# Patient Record
Sex: FEMALE | Race: WHITE | Employment: OTHER | URBAN - METROPOLITAN AREA
[De-identification: names, ages, dates, MRNs, and addresses within clinical notes are randomized per-mention and may not be internally consistent; named-entity substitution may affect disease eponyms.]

---

## 2020-07-13 VITALS
SYSTOLIC BLOOD PRESSURE: 140 MMHG | HEART RATE: 74 BPM | BODY MASS INDEX: 39.49 KG/M2 | OXYGEN SATURATION: 96 % | DIASTOLIC BLOOD PRESSURE: 87 MMHG | HEIGHT: 65 IN | WEIGHT: 237 LBS | TEMPERATURE: 98.4 F

## 2020-07-13 PROBLEM — F41.9 ANXIETY: Status: ACTIVE | Noted: 2020-07-13

## 2020-07-13 PROBLEM — M10.9 GOUT: Status: ACTIVE | Noted: 2020-07-13

## 2020-07-13 PROBLEM — I10 BENIGN ESSENTIAL HYPERTENSION: Status: ACTIVE | Noted: 2020-07-13

## 2020-07-13 PROBLEM — F32.A DEPRESSIVE DISORDER: Status: ACTIVE | Noted: 2020-07-13

## 2020-07-13 RX ORDER — PAROXETINE HYDROCHLORIDE 20 MG/1
TABLET, FILM COATED ORAL DAILY
COMMUNITY
End: 2020-07-27 | Stop reason: SDUPTHER

## 2020-07-13 RX ORDER — ALPRAZOLAM 0.5 MG/1
TABLET ORAL
COMMUNITY
End: 2020-08-03 | Stop reason: SDUPTHER

## 2020-07-13 RX ORDER — AMLODIPINE BESYLATE 2.5 MG/1
TABLET ORAL DAILY
COMMUNITY
End: 2020-07-27 | Stop reason: SDUPTHER

## 2020-07-13 RX ORDER — LISINOPRIL 20 MG/1
TABLET ORAL DAILY
COMMUNITY
End: 2020-07-27 | Stop reason: SDUPTHER

## 2020-07-13 RX ORDER — ALLOPURINOL 100 MG/1
TABLET ORAL DAILY
COMMUNITY
End: 2020-08-03 | Stop reason: SDUPTHER

## 2020-07-21 DIAGNOSIS — I10 BENIGN ESSENTIAL HYPERTENSION: Primary | ICD-10-CM

## 2020-07-21 DIAGNOSIS — F32.A DEPRESSIVE DISORDER: ICD-10-CM

## 2020-07-21 RX ORDER — PAROXETINE HYDROCHLORIDE 20 MG/1
TABLET, FILM COATED ORAL
Qty: 180 TAB | Refills: 0 | OUTPATIENT
Start: 2020-07-21

## 2020-07-27 RX ORDER — AMLODIPINE BESYLATE 2.5 MG/1
2.5 TABLET ORAL DAILY
Qty: 30 TAB | Refills: 0 | Status: SHIPPED | OUTPATIENT
Start: 2020-07-27 | End: 2020-08-03 | Stop reason: SDUPTHER

## 2020-07-27 RX ORDER — PAROXETINE HYDROCHLORIDE 20 MG/1
20 TABLET, FILM COATED ORAL DAILY
Qty: 30 TAB | Refills: 0 | Status: SHIPPED | OUTPATIENT
Start: 2020-07-27 | End: 2020-08-03 | Stop reason: SDUPTHER

## 2020-07-27 RX ORDER — LISINOPRIL 20 MG/1
20 TABLET ORAL DAILY
Qty: 30 TAB | Refills: 0 | Status: SHIPPED | OUTPATIENT
Start: 2020-07-27 | End: 2020-08-03 | Stop reason: SDUPTHER

## 2020-07-27 NOTE — TELEPHONE ENCOUNTER
Patient needs refills on lisinopril, paroxetine, & amlodipine; her appt is scheduled for 08/03/20 but she is completely out of her meds.

## 2020-07-31 VITALS
TEMPERATURE: 98 F | OXYGEN SATURATION: 96 % | HEART RATE: 74 BPM | SYSTOLIC BLOOD PRESSURE: 140 MMHG | WEIGHT: 237 LBS | HEIGHT: 65 IN | BODY MASS INDEX: 39.49 KG/M2 | DIASTOLIC BLOOD PRESSURE: 87 MMHG

## 2020-08-03 ENCOUNTER — OFFICE VISIT (OUTPATIENT)
Dept: PRIMARY CARE CLINIC | Age: 70
End: 2020-08-03
Payer: MEDICARE

## 2020-08-03 VITALS
BODY MASS INDEX: 42.32 KG/M2 | RESPIRATION RATE: 18 BRPM | HEIGHT: 65 IN | OXYGEN SATURATION: 96 % | WEIGHT: 254 LBS | HEART RATE: 79 BPM | TEMPERATURE: 97 F | DIASTOLIC BLOOD PRESSURE: 84 MMHG | SYSTOLIC BLOOD PRESSURE: 126 MMHG

## 2020-08-03 DIAGNOSIS — I10 BENIGN ESSENTIAL HYPERTENSION: ICD-10-CM

## 2020-08-03 DIAGNOSIS — F32.A DEPRESSIVE DISORDER: ICD-10-CM

## 2020-08-03 DIAGNOSIS — M1A.0610 CHRONIC GOUT OF RIGHT KNEE, UNSPECIFIED CAUSE: ICD-10-CM

## 2020-08-03 DIAGNOSIS — F41.9 ANXIETY: Primary | ICD-10-CM

## 2020-08-03 PROBLEM — E66.01 OBESITY, MORBID (HCC): Status: ACTIVE | Noted: 2020-08-03

## 2020-08-03 PROCEDURE — G9717 DOC PT DX DEP/BP F/U NT REQ: HCPCS | Performed by: NURSE PRACTITIONER

## 2020-08-03 PROCEDURE — 1101F PT FALLS ASSESS-DOCD LE1/YR: CPT | Performed by: NURSE PRACTITIONER

## 2020-08-03 PROCEDURE — G8419 CALC BMI OUT NRM PARAM NOF/U: HCPCS | Performed by: NURSE PRACTITIONER

## 2020-08-03 PROCEDURE — 1090F PRES/ABSN URINE INCON ASSESS: CPT | Performed by: NURSE PRACTITIONER

## 2020-08-03 PROCEDURE — G8400 PT W/DXA NO RESULTS DOC: HCPCS | Performed by: NURSE PRACTITIONER

## 2020-08-03 PROCEDURE — G8427 DOCREV CUR MEDS BY ELIG CLIN: HCPCS | Performed by: NURSE PRACTITIONER

## 2020-08-03 PROCEDURE — G8536 NO DOC ELDER MAL SCRN: HCPCS | Performed by: NURSE PRACTITIONER

## 2020-08-03 PROCEDURE — 99214 OFFICE O/P EST MOD 30 MIN: CPT | Performed by: NURSE PRACTITIONER

## 2020-08-03 PROCEDURE — 3017F COLORECTAL CA SCREEN DOC REV: CPT | Performed by: NURSE PRACTITIONER

## 2020-08-03 RX ORDER — ALPRAZOLAM 0.5 MG/1
0.5 TABLET ORAL
Qty: 30 TAB | Refills: 5 | Status: SHIPPED | OUTPATIENT
Start: 2020-08-03 | End: 2021-02-24 | Stop reason: SDUPTHER

## 2020-08-03 RX ORDER — AMLODIPINE BESYLATE 2.5 MG/1
2.5 TABLET ORAL DAILY
Qty: 90 TAB | Refills: 1 | Status: SHIPPED | OUTPATIENT
Start: 2020-08-03 | End: 2021-02-24 | Stop reason: SDUPTHER

## 2020-08-03 RX ORDER — ALLOPURINOL 100 MG/1
100 TABLET ORAL DAILY
Qty: 90 TAB | Refills: 1 | Status: SHIPPED | OUTPATIENT
Start: 2020-08-03 | End: 2021-11-01 | Stop reason: ALTCHOICE

## 2020-08-03 RX ORDER — PAROXETINE HYDROCHLORIDE 20 MG/1
40 TABLET, FILM COATED ORAL DAILY
Qty: 180 TAB | Refills: 1 | Status: SHIPPED | OUTPATIENT
Start: 2020-08-03 | End: 2021-02-24 | Stop reason: SDUPTHER

## 2020-08-03 RX ORDER — LISINOPRIL 20 MG/1
20 TABLET ORAL DAILY
Qty: 90 TAB | Refills: 1 | Status: SHIPPED | OUTPATIENT
Start: 2020-08-03 | End: 2021-02-24 | Stop reason: SDUPTHER

## 2020-08-03 NOTE — PROGRESS NOTES
HISTORY OF PRESENT ILLNESS  Amanda Avina is a 81 y.o. female presents to the office for medication refill and lab work    1. Hypertension: Patients hypertension is well controlled on regimen of amlodipine and lisinopril. Denies headaches, blurred vision or dizziness. 2. Depression/Anxiety:  Patient notes that their depression is well controlled on paxil. Uses xanax PRN for anxiety. Denies SI/HI. 3. Gout:  Patient reports good control over gout on allopurinol. Notes  Zero flare ups in the past 6 months. Notes some pain in thumb but does not think this is related to gout. Vitals:    08/03/20 0817   BP: 126/84   Pulse: 79   Resp: 18   Temp: 97 °F (36.1 °C)   TempSrc: Temporal   SpO2: 96%   Weight: 254 lb (115.2 kg)   Height: 5' 5\" (1.651 m)     Patient Active Problem List   Diagnosis Code    Anxiety F41.9    Benign essential hypertension I10    Depressive disorder F32.9    Gout M10.9    Obesity, morbid ([facility] [address] E66.01     Patient Active Problem List    Diagnosis Date Noted    Obesity, morbid ([address] 08/03/2020    Anxiety 07/13/2020    Benign essential hypertension 07/13/2020    Depressive disorder 07/13/2020    Gout 07/13/2020     Current Outpatient Medications   Medication Sig Dispense Refill    PARoxetine (PAXIL) 20 mg tablet Take 2 Tabs by mouth daily. 180 Tab 1    lisinopriL (PRINIVIL, ZESTRIL) 20 mg tablet Take 1 Tab by mouth daily. 90 Tab 1    amLODIPine (NORVASC) 2.5 mg tablet Take 1 Tab by mouth daily. 90 Tab 1    ALPRAZolam (XANAX) 0.5 mg tablet Take 1 Tab by mouth daily as needed for Anxiety. 30 Tab 5    allopurinoL (ZYLOPRIM) 100 mg tablet Take 1 Tab by mouth daily.  90 Tab 1     Allergies   Allergen Reactions    Tree And Shrub Pollen Unknown (comments)     Past Medical History:   Diagnosis Date    Anemia     Anxiety     Back pain     Depression     Hypertension      Past Surgical History:   Procedure Laterality Date    HX TUBAL LIGATION       Family History   Problem Relation Age of Onset    Stroke Father     Heart Failure Brother     Hypertension Other     Depression Other     Anxiety Other     Anemia Other     Heart Failure Other     Diabetes Other         type 2     Social History     Tobacco Use    Smoking status: Never Smoker    Smokeless tobacco: Never Used   Substance Use Topics    Alcohol use: Not Currently           Review of Systems   Constitutional: Negative for malaise/fatigue and weight loss. Eyes: Negative for blurred vision, double vision and pain. Respiratory: Negative for cough and shortness of breath. Cardiovascular: Negative for chest pain, palpitations, orthopnea and leg swelling. Gastrointestinal: Negative for heartburn and nausea. Musculoskeletal: Negative for joint pain (left thumb) and myalgias. Skin: Negative for itching and rash. Neurological: Negative for dizziness, tingling, loss of consciousness, weakness and headaches. Endo/Heme/Allergies: Does not bruise/bleed easily. Psychiatric/Behavioral: Negative for depression. The patient is nervous/anxious. Physical Exam  Constitutional:       Appearance: Normal appearance. She is obese. HENT:      Head: Normocephalic. Eyes:      Extraocular Movements: Extraocular movements intact. Conjunctiva/sclera: Conjunctivae normal.      Pupils: Pupils are equal, round, and reactive to light. Neck:      Musculoskeletal: Normal range of motion and neck supple. Cardiovascular:      Rate and Rhythm: Normal rate and regular rhythm. Pulses: Normal pulses. Heart sounds: Normal heart sounds. Pulmonary:      Effort: Pulmonary effort is normal.      Breath sounds: Normal breath sounds. Musculoskeletal: Normal range of motion. Skin:     General: Skin is warm and dry. Neurological:      General: No focal deficit present. Mental Status: She is alert and oriented to person, place, and time.    Psychiatric:         Mood and Affect: Mood normal. ASSESSMENT and PLAN    Diagnoses and all orders for this visit:    1. Anxiety  -     ALPRAZolam (XANAX) 0.5 mg tablet; Take 1 Tab by mouth daily as needed for Anxiety. -     CBC WITH AUTOMATED DIFF    2. Depressive disorder  -     PARoxetine (PAXIL) 20 mg tablet; Take 2 Tabs by mouth daily. 3. Benign essential hypertension  -     lisinopriL (PRINIVIL, ZESTRIL) 20 mg tablet; Take 1 Tab by mouth daily. -     amLODIPine (NORVASC) 2.5 mg tablet; Take 1 Tab by mouth daily.  -     CBC WITH AUTOMATED DIFF  -     METABOLIC PANEL, COMPREHENSIVE    4. Chronic gout of right knee, unspecified cause  -     allopurinoL (ZYLOPRIM) 100 mg tablet;  Take 1 Tab by mouth daily.  -     METABOLIC PANEL, COMPREHENSIVE  -     URIC ACID      Last PDMP Eric as Reviewed:  Review User Review Instant Review Result   Vernhuey Tate 8/3/2020  8:32 AM Reviewed PDMP [1]     Berenda Epley, NP

## 2020-08-04 ENCOUNTER — TELEPHONE (OUTPATIENT)
Dept: PRIMARY CARE CLINIC | Age: 70
End: 2020-08-04

## 2020-08-04 LAB
ALBUMIN SERPL-MCNC: 4.4 G/DL (ref 3.8–4.8)
ALBUMIN/GLOB SERPL: 1.5 {RATIO} (ref 1.2–2.2)
ALP SERPL-CCNC: 93 IU/L (ref 39–117)
ALT SERPL-CCNC: 20 IU/L (ref 0–32)
AST SERPL-CCNC: 17 IU/L (ref 0–40)
BASOPHILS # BLD AUTO: 0.1 X10E3/UL (ref 0–0.2)
BASOPHILS NFR BLD AUTO: 1 %
BILIRUB SERPL-MCNC: 0.4 MG/DL (ref 0–1.2)
BUN SERPL-MCNC: 9 MG/DL (ref 8–27)
BUN/CREAT SERPL: 12 (ref 12–28)
CALCIUM SERPL-MCNC: 9.1 MG/DL (ref 8.7–10.3)
CHLORIDE SERPL-SCNC: 101 MMOL/L (ref 96–106)
CO2 SERPL-SCNC: 23 MMOL/L (ref 20–29)
CREAT SERPL-MCNC: 0.78 MG/DL (ref 0.57–1)
EOSINOPHIL # BLD AUTO: 0.3 X10E3/UL (ref 0–0.4)
EOSINOPHIL NFR BLD AUTO: 4 %
ERYTHROCYTE [DISTWIDTH] IN BLOOD BY AUTOMATED COUNT: 12.9 % (ref 11.7–15.4)
GLOBULIN SER CALC-MCNC: 2.9 G/DL (ref 1.5–4.5)
GLUCOSE SERPL-MCNC: 101 MG/DL (ref 65–99)
HCT VFR BLD AUTO: 41.2 % (ref 34–46.6)
HGB BLD-MCNC: 13.8 G/DL (ref 11.1–15.9)
IMM GRANULOCYTES # BLD AUTO: 0.1 X10E3/UL (ref 0–0.1)
IMM GRANULOCYTES NFR BLD AUTO: 1 %
LYMPHOCYTES # BLD AUTO: 2.5 X10E3/UL (ref 0.7–3.1)
LYMPHOCYTES NFR BLD AUTO: 28 %
MCH RBC QN AUTO: 29.4 PG (ref 26.6–33)
MCHC RBC AUTO-ENTMCNC: 33.5 G/DL (ref 31.5–35.7)
MCV RBC AUTO: 88 FL (ref 79–97)
MONOCYTES # BLD AUTO: 1 X10E3/UL (ref 0.1–0.9)
MONOCYTES NFR BLD AUTO: 11 %
NEUTROPHILS # BLD AUTO: 5 X10E3/UL (ref 1.4–7)
NEUTROPHILS NFR BLD AUTO: 55 %
PLATELET # BLD AUTO: 294 X10E3/UL (ref 150–450)
POTASSIUM SERPL-SCNC: 4 MMOL/L (ref 3.5–5.2)
PROT SERPL-MCNC: 7.3 G/DL (ref 6–8.5)
RBC # BLD AUTO: 4.69 X10E6/UL (ref 3.77–5.28)
SODIUM SERPL-SCNC: 141 MMOL/L (ref 134–144)
URATE SERPL-MCNC: 6.5 MG/DL (ref 2.5–7.1)
WBC # BLD AUTO: 9 X10E3/UL (ref 3.4–10.8)

## 2021-02-24 ENCOUNTER — VIRTUAL VISIT (OUTPATIENT)
Dept: PRIMARY CARE CLINIC | Age: 71
End: 2021-02-24
Payer: MEDICARE

## 2021-02-24 DIAGNOSIS — F32.A DEPRESSIVE DISORDER: ICD-10-CM

## 2021-02-24 DIAGNOSIS — F41.9 ANXIETY: ICD-10-CM

## 2021-02-24 DIAGNOSIS — I10 BENIGN ESSENTIAL HYPERTENSION: ICD-10-CM

## 2021-02-24 DIAGNOSIS — E66.01 CLASS 3 SEVERE OBESITY WITH SERIOUS COMORBIDITY AND BODY MASS INDEX (BMI) OF 40.0 TO 44.9 IN ADULT, UNSPECIFIED OBESITY TYPE (HCC): Primary | ICD-10-CM

## 2021-02-24 DIAGNOSIS — M1A.0610 CHRONIC GOUT OF RIGHT KNEE, UNSPECIFIED CAUSE: ICD-10-CM

## 2021-02-24 PROCEDURE — 3017F COLORECTAL CA SCREEN DOC REV: CPT | Performed by: NURSE PRACTITIONER

## 2021-02-24 PROCEDURE — G9717 DOC PT DX DEP/BP F/U NT REQ: HCPCS | Performed by: NURSE PRACTITIONER

## 2021-02-24 PROCEDURE — 1090F PRES/ABSN URINE INCON ASSESS: CPT | Performed by: NURSE PRACTITIONER

## 2021-02-24 PROCEDURE — G8400 PT W/DXA NO RESULTS DOC: HCPCS | Performed by: NURSE PRACTITIONER

## 2021-02-24 PROCEDURE — 1101F PT FALLS ASSESS-DOCD LE1/YR: CPT | Performed by: NURSE PRACTITIONER

## 2021-02-24 PROCEDURE — G8756 NO BP MEASURE DOC: HCPCS | Performed by: NURSE PRACTITIONER

## 2021-02-24 PROCEDURE — 99214 OFFICE O/P EST MOD 30 MIN: CPT | Performed by: NURSE PRACTITIONER

## 2021-02-24 PROCEDURE — G8427 DOCREV CUR MEDS BY ELIG CLIN: HCPCS | Performed by: NURSE PRACTITIONER

## 2021-02-24 RX ORDER — AMLODIPINE BESYLATE 2.5 MG/1
2.5 TABLET ORAL DAILY
Qty: 90 TAB | Refills: 1 | Status: SHIPPED | OUTPATIENT
Start: 2021-02-24 | End: 2021-10-07 | Stop reason: SDUPTHER

## 2021-02-24 RX ORDER — PAROXETINE HYDROCHLORIDE 20 MG/1
40 TABLET, FILM COATED ORAL DAILY
Qty: 180 TAB | Refills: 1 | Status: SHIPPED | OUTPATIENT
Start: 2021-02-24 | End: 2021-09-16 | Stop reason: SDUPTHER

## 2021-02-24 RX ORDER — ALPRAZOLAM 0.5 MG/1
0.5 TABLET ORAL
Qty: 30 TAB | Refills: 5 | Status: SHIPPED | OUTPATIENT
Start: 2021-02-24 | End: 2021-10-07 | Stop reason: SDUPTHER

## 2021-02-24 RX ORDER — LISINOPRIL 20 MG/1
20 TABLET ORAL DAILY
Qty: 90 TAB | Refills: 1 | Status: SHIPPED | OUTPATIENT
Start: 2021-02-24 | End: 2021-10-07 | Stop reason: SDUPTHER

## 2021-02-24 NOTE — PROGRESS NOTES
HISTORY OF PRESENT ILLNESS  Lissy Pina is a 78 y.o. female presents via telemedicine for medication refill. 1. Hypertension: Patients hypertension is well controlled on regimen of amlodipine and lisinopril. Denies headaches, blurred vision or dizziness.        2. Depression/Anxiety:  Patient notes that their depression is well controlled on paxil. Uses xanax PRN for anxiety. Denies SI/HI.     3. Gout: Patient reported that she hasn't been using her allopurinol since she hasn't had a gout flare up in a few years. There were no vitals filed for this visit. Patient Active Problem List   Diagnosis Code    Anxiety F41.9    Benign essential hypertension I10    Depressive disorder F32.9    Gout M10.9    Obesity, morbid ([facility] [address] E66.01     Patient Active Problem List    Diagnosis Date Noted    Obesity, morbid ([facility] [address] 08/03/2020    Anxiety 07/13/2020    Benign essential hypertension 07/13/2020    Depressive disorder 07/13/2020    Gout 07/13/2020     Current Outpatient Medications   Medication Sig Dispense Refill    lisinopriL (PRINIVIL, ZESTRIL) 20 mg tablet Take 1 Tab by mouth daily. 90 Tab 1    PARoxetine (PAXIL) 20 mg tablet Take 2 Tabs by mouth daily. 180 Tab 1    amLODIPine (NORVASC) 2.5 mg tablet Take 1 Tab by mouth daily. 90 Tab 1    ALPRAZolam (XANAX) 0.5 mg tablet Take 1 Tab by mouth daily as needed for Anxiety. 30 Tab 5    allopurinoL (ZYLOPRIM) 100 mg tablet Take 1 Tab by mouth daily.  90 Tab 1     Allergies   Allergen Reactions    Tree And Shrub Pollen Unknown (comments)     Past Medical History:   Diagnosis Date    Anemia     Anxiety     Back pain     Depression     Hypertension      Past Surgical History:   Procedure Laterality Date    HX TUBAL LIGATION       Family History   Problem Relation Age of Onset    Stroke Father     Heart Failure Brother     Hypertension Other     Depression Other     Anxiety Other     Anemia Other     Heart Failure Other     Diabetes Other type 2     Social History     Tobacco Use    Smoking status: Never Smoker    Smokeless tobacco: Never Used   Substance Use Topics    Alcohol use: Not Currently           Review of Systems   Constitutional: Negative for malaise/fatigue and weight loss. Eyes: Negative for blurred vision and double vision. Respiratory: Negative for shortness of breath. Cardiovascular: Negative for chest pain and palpitations. Gastrointestinal: Negative for nausea and vomiting. Musculoskeletal: Negative for joint pain and myalgias. Neurological: Negative for dizziness, tingling, weakness and headaches. Psychiatric/Behavioral: Negative for depression. The patient is nervous/anxious. The patient does not have insomnia. Physical Exam  Constitutional:       Appearance: Normal appearance. She is obese. HENT:      Head: Normocephalic. Eyes:      Conjunctiva/sclera: Conjunctivae normal.   Pulmonary:      Effort: Pulmonary effort is normal.   Skin:     General: Skin is warm and dry. Neurological:      Mental Status: She is alert and oriented to person, place, and time. Psychiatric:         Mood and Affect: Mood normal.         Behavior: Behavior normal.           ASSESSMENT and PLAN  Diagnoses and all orders for this visit:    1. Class 3 severe obesity with serious comorbidity and body mass index (BMI) of 40.0 to 44.9 in adult, unspecified obesity type ([facility] [address]  Comments:  checking lipid panel. Orders:  -     LIPID PANEL    2. Benign essential hypertension  Comments:  well controlled on current regimen. Orders:  -     lisinopriL (PRINIVIL, ZESTRIL) 20 mg tablet; Take 1 Tab by mouth daily. -     amLODIPine (NORVASC) 2.5 mg tablet; Take 1 Tab by mouth daily.  -     METABOLIC PANEL, COMPREHENSIVE    3. Depressive disorder  Comments:  stable on paxil  Orders:  -     PARoxetine (PAXIL) 20 mg tablet; Take 2 Tabs by mouth daily.  -     CBC WITH AUTOMATED DIFF    4.  Chronic gout of right knee, unspecified cause  Comments:  Has not been using allopurinol recently. Denies any gout flare ups. 5. Anxiety  Comments:  xanax PRN. Orders:  -     ALPRAZolam (XANAX) 0.5 mg tablet; Take 1 Tab by mouth daily as needed for Anxiety. -     CBC WITH AUTOMATED DIFF      Last PDMP Eric as Reviewed:  Review User Review Instant Review Result   Omari Steward 2/24/2021  1:56 PM Reviewed PDMP [1]         Kaya Krause, who was evaluated through a synchronous (real-time) audio-video encounter, and/or her healthcare decision maker, is aware that it is a billable service, with coverage as determined by her insurance carrier. She provided verbal consent to proceed: Yes, and patient identification was verified. It was conducted pursuant to the emergency declaration under the [address] [place] [address] and the [email or web address] and [employer] Act. A caregiver was present when appropriate. Ability to conduct physical exam was limited. I was at home. The patient was at home. Kaya Krause is a 78 y.o. female being evaluated by a Virtual Visit (video visit) encounter to address concerns as mentioned above. A caregiver was present when appropriate. Due to this being a TeleHealth encounter (During KUBNO-23 public health emergency), evaluation of the following organ systems was limited: Vitals/Constitutional/EENT/Resp/CV/GI//MS/Neuro/Skin/Heme-Lymph-Imm. Pursuant to the emergency declaration under the [address] [place] [address] and the [email or web address] and [employer] Act, this Virtual Visit was conducted with patient's (and/or legal guardian's) consent, to reduce the risk of exposure to COVID-19 and provide necessary medical care. Services were provided through a video synchronous discussion virtually to substitute for in-person encounter.     --Alma Herzog NP on 2/24/2021 at 1:46 PM    An electronic signature was used to authenticate this note.

## 2021-03-18 LAB
ALBUMIN SERPL-MCNC: 4.4 G/DL (ref 3.8–4.8)
ALBUMIN/GLOB SERPL: 1.6 {RATIO} (ref 1.2–2.2)
ALP SERPL-CCNC: 94 IU/L (ref 39–117)
ALT SERPL-CCNC: 18 IU/L (ref 0–32)
AST SERPL-CCNC: 22 IU/L (ref 0–40)
BASOPHILS # BLD AUTO: 0.1 X10E3/UL (ref 0–0.2)
BASOPHILS NFR BLD AUTO: 1 %
BILIRUB SERPL-MCNC: 0.4 MG/DL (ref 0–1.2)
BUN SERPL-MCNC: 9 MG/DL (ref 8–27)
BUN/CREAT SERPL: 11 (ref 12–28)
CALCIUM SERPL-MCNC: 8.9 MG/DL (ref 8.7–10.3)
CHLORIDE SERPL-SCNC: 103 MMOL/L (ref 96–106)
CHOLEST SERPL-MCNC: 147 MG/DL (ref 100–199)
CO2 SERPL-SCNC: 27 MMOL/L (ref 20–29)
CREAT SERPL-MCNC: 0.81 MG/DL (ref 0.57–1)
EOSINOPHIL # BLD AUTO: 0.3 X10E3/UL (ref 0–0.4)
EOSINOPHIL NFR BLD AUTO: 4 %
ERYTHROCYTE [DISTWIDTH] IN BLOOD BY AUTOMATED COUNT: 12.9 % (ref 11.7–15.4)
GLOBULIN SER CALC-MCNC: 2.8 G/DL (ref 1.5–4.5)
GLUCOSE SERPL-MCNC: 99 MG/DL (ref 65–99)
HCT VFR BLD AUTO: 43.5 % (ref 34–46.6)
HDLC SERPL-MCNC: 58 MG/DL
HGB BLD-MCNC: 14.7 G/DL (ref 11.1–15.9)
IMM GRANULOCYTES # BLD AUTO: 0.1 X10E3/UL (ref 0–0.1)
IMM GRANULOCYTES NFR BLD AUTO: 1 %
LDLC SERPL CALC-MCNC: 59 MG/DL (ref 0–99)
LYMPHOCYTES # BLD AUTO: 2.6 X10E3/UL (ref 0.7–3.1)
LYMPHOCYTES NFR BLD AUTO: 29 %
MCH RBC QN AUTO: 29.4 PG (ref 26.6–33)
MCHC RBC AUTO-ENTMCNC: 33.8 G/DL (ref 31.5–35.7)
MCV RBC AUTO: 87 FL (ref 79–97)
MONOCYTES # BLD AUTO: 0.8 X10E3/UL (ref 0.1–0.9)
MONOCYTES NFR BLD AUTO: 8 %
NEUTROPHILS # BLD AUTO: 5.1 X10E3/UL (ref 1.4–7)
NEUTROPHILS NFR BLD AUTO: 57 %
PLATELET # BLD AUTO: 306 X10E3/UL (ref 150–450)
POTASSIUM SERPL-SCNC: 4.1 MMOL/L (ref 3.5–5.2)
PROT SERPL-MCNC: 7.2 G/DL (ref 6–8.5)
RBC # BLD AUTO: 5 X10E6/UL (ref 3.77–5.28)
SODIUM SERPL-SCNC: 142 MMOL/L (ref 134–144)
TRIGL SERPL-MCNC: 183 MG/DL (ref 0–149)
VLDLC SERPL CALC-MCNC: 30 MG/DL (ref 5–40)
WBC # BLD AUTO: 9 X10E3/UL (ref 3.4–10.8)

## 2021-09-16 ENCOUNTER — TELEPHONE (OUTPATIENT)
Dept: PRIMARY CARE CLINIC | Age: 71
End: 2021-09-16

## 2021-09-16 DIAGNOSIS — F32.A DEPRESSIVE DISORDER: ICD-10-CM

## 2021-09-16 RX ORDER — PAROXETINE HYDROCHLORIDE 20 MG/1
40 TABLET, FILM COATED ORAL DAILY
Qty: 60 TABLET | Refills: 0 | Status: SHIPPED | OUTPATIENT
Start: 2021-09-16 | End: 2021-11-01 | Stop reason: SDUPTHER

## 2021-09-16 NOTE — TELEPHONE ENCOUNTER
Patient called and needs a refill on her Paxil 20 mg to hold her over until her appt on 10/5/2021 at 7:30 am sent into the pharmacy. She can be reached at [phone] if there are any questions.

## 2021-10-07 ENCOUNTER — TELEPHONE (OUTPATIENT)
Dept: PRIMARY CARE CLINIC | Age: 71
End: 2021-10-07

## 2021-10-07 ENCOUNTER — VIRTUAL VISIT (OUTPATIENT)
Dept: PRIMARY CARE CLINIC | Age: 71
End: 2021-10-07

## 2021-10-07 DIAGNOSIS — I10 BENIGN ESSENTIAL HYPERTENSION: ICD-10-CM

## 2021-10-07 DIAGNOSIS — F41.9 ANXIETY: ICD-10-CM

## 2021-10-07 RX ORDER — ALPRAZOLAM 0.5 MG/1
0.5 TABLET ORAL
Qty: 30 TABLET | Refills: 0 | Status: SHIPPED | OUTPATIENT
Start: 2021-10-07 | End: 2021-11-01 | Stop reason: SDUPTHER

## 2021-10-07 RX ORDER — LISINOPRIL 20 MG/1
20 TABLET ORAL DAILY
Qty: 30 TABLET | Refills: 0 | Status: SHIPPED | OUTPATIENT
Start: 2021-10-07 | End: 2021-11-01 | Stop reason: SDUPTHER

## 2021-10-07 RX ORDER — AMLODIPINE BESYLATE 2.5 MG/1
2.5 TABLET ORAL DAILY
Qty: 30 TABLET | Refills: 0 | Status: SHIPPED | OUTPATIENT
Start: 2021-10-07 | End: 2021-11-01 | Stop reason: SDUPTHER

## 2021-10-07 NOTE — PROGRESS NOTES
Patient unable to join virtual call. Must have in office appointment booked going further. Same issue occurred last month with virtual call.

## 2021-10-07 NOTE — PROGRESS NOTES
There were no vitals taken for this visit. 1. Have you been to the ER, urgent care clinic since your last visit? Hospitalized since your last visit?no    2. Have you seen or consulted any other health care providers outside of the [address] [place] [address] since your last visit? Include any pap smears or colon screening.  no  Chief Complaint   Patient presents with    Follow Up Chronic Condition     Pt is asking for refills on amlodipine, lisinopril, paxil, and xanax

## 2021-10-07 NOTE — TELEPHONE ENCOUNTER
Last PDMP Naz Tirado as Reviewed:  Review User Review Instant Review Result   Armandina Dance 10/7/2021  3:11 PM Reviewed PDMP [1]

## 2021-11-01 ENCOUNTER — OFFICE VISIT (OUTPATIENT)
Dept: PRIMARY CARE CLINIC | Age: 71
End: 2021-11-01
Payer: MEDICARE

## 2021-11-01 VITALS
OXYGEN SATURATION: 97 % | HEART RATE: 89 BPM | BODY MASS INDEX: 42.32 KG/M2 | WEIGHT: 254 LBS | SYSTOLIC BLOOD PRESSURE: 124 MMHG | DIASTOLIC BLOOD PRESSURE: 70 MMHG | RESPIRATION RATE: 18 BRPM | HEIGHT: 65 IN | TEMPERATURE: 97.3 F

## 2021-11-01 DIAGNOSIS — I10 BENIGN ESSENTIAL HYPERTENSION: ICD-10-CM

## 2021-11-01 DIAGNOSIS — F41.9 ANXIETY: ICD-10-CM

## 2021-11-01 DIAGNOSIS — Z11.59 NEED FOR HEPATITIS C SCREENING TEST: ICD-10-CM

## 2021-11-01 DIAGNOSIS — F32.A DEPRESSIVE DISORDER: ICD-10-CM

## 2021-11-01 DIAGNOSIS — Z12.11 SCREEN FOR COLON CANCER: ICD-10-CM

## 2021-11-01 DIAGNOSIS — Z00.00 MEDICARE ANNUAL WELLNESS VISIT, SUBSEQUENT: Primary | ICD-10-CM

## 2021-11-01 PROCEDURE — G0439 PPPS, SUBSEQ VISIT: HCPCS | Performed by: NURSE PRACTITIONER

## 2021-11-01 PROCEDURE — 3017F COLORECTAL CA SCREEN DOC REV: CPT | Performed by: NURSE PRACTITIONER

## 2021-11-01 PROCEDURE — G9717 DOC PT DX DEP/BP F/U NT REQ: HCPCS | Performed by: NURSE PRACTITIONER

## 2021-11-01 PROCEDURE — G8754 DIAS BP LESS 90: HCPCS | Performed by: NURSE PRACTITIONER

## 2021-11-01 PROCEDURE — G8752 SYS BP LESS 140: HCPCS | Performed by: NURSE PRACTITIONER

## 2021-11-01 PROCEDURE — G8400 PT W/DXA NO RESULTS DOC: HCPCS | Performed by: NURSE PRACTITIONER

## 2021-11-01 PROCEDURE — G8427 DOCREV CUR MEDS BY ELIG CLIN: HCPCS | Performed by: NURSE PRACTITIONER

## 2021-11-01 PROCEDURE — 1090F PRES/ABSN URINE INCON ASSESS: CPT | Performed by: NURSE PRACTITIONER

## 2021-11-01 PROCEDURE — 1101F PT FALLS ASSESS-DOCD LE1/YR: CPT | Performed by: NURSE PRACTITIONER

## 2021-11-01 PROCEDURE — G8536 NO DOC ELDER MAL SCRN: HCPCS | Performed by: NURSE PRACTITIONER

## 2021-11-01 PROCEDURE — 99214 OFFICE O/P EST MOD 30 MIN: CPT | Performed by: NURSE PRACTITIONER

## 2021-11-01 PROCEDURE — G8417 CALC BMI ABV UP PARAM F/U: HCPCS | Performed by: NURSE PRACTITIONER

## 2021-11-01 RX ORDER — LISINOPRIL 20 MG/1
20 TABLET ORAL DAILY
Qty: 90 TABLET | Refills: 1 | Status: SHIPPED | OUTPATIENT
Start: 2021-11-01 | End: 2022-05-26 | Stop reason: SDUPTHER

## 2021-11-01 RX ORDER — PAROXETINE HYDROCHLORIDE 20 MG/1
40 TABLET, FILM COATED ORAL DAILY
Qty: 180 TABLET | Refills: 1 | Status: SHIPPED | OUTPATIENT
Start: 2021-11-01 | End: 2022-05-18

## 2021-11-01 RX ORDER — ALPRAZOLAM 0.5 MG/1
0.5 TABLET ORAL
Qty: 30 TABLET | Refills: 3 | Status: SHIPPED | OUTPATIENT
Start: 2021-11-01 | End: 2022-05-26 | Stop reason: SDUPTHER

## 2021-11-01 RX ORDER — AMLODIPINE BESYLATE 2.5 MG/1
2.5 TABLET ORAL DAILY
Qty: 90 TABLET | Refills: 1 | Status: SHIPPED | OUTPATIENT
Start: 2021-11-01 | End: 2022-05-26 | Stop reason: SDUPTHER

## 2021-11-01 NOTE — PROGRESS NOTES
Pauly Escobar is a 73 y.o. female presents for Medicare wellness visit. This is a Subsequent Medicare Annual Wellness Visit (AWV), (Performed more than 12 months after effective date of Medicare Part B enrollment and 12 months after last preventive visit.)    I have reviewed the patient's medical history in detail and updated the computerized patient record. History obtained from: the patient. female  73 y.o. WHITE/NON-  Depression Risk Factor Screening:   No flowsheet data found. Fall Risk Factor Screening:     Fall Risk Assessment, last 12 mths 11/1/2021   Able to walk? Yes   Fall in past 12 months? 0   Do you feel unsteady? 0   Are you worried about falling 0       Alcohol Risk Screen    Do you average more than 1 drink per night or more than 7 drinks a week:  No    On any one occasion in the past three months have you have had more than 3 drinks containing alcohol:  No         Functional Ability and Level of Safety:    Hearing: Hearing is good. Activities of Daily Living: The home contains: no safety equipment. Patient does total self care      Ambulation: with no difficulty      Abuse Screen:  Patient is not abused         History and Pertinent Exam   Patient is due for chronic medical conditions and/or has acute concerns in addition to the medicare wellness visit and agrees to do both, understanding there may be a copay for the additional services provided. Other Concerns today:     Hypertension: Patients hypertension is well controlled on regimen of amlodipine and lisinopril. Denies headaches, blurred vision or dizziness. BP is elevated today but patient notes that she is stressed because her freezer is going bad and she has been out running errands trying to find a new freezer.       Depression/Anxiety:  Patient notes that their depression is well controlled on paxil.  Uses xanax PRN for anxiety. Denies SI/HI.     Health & Diet:   Please describe your diet habits: Regular diet. Do you get 5 servings of fruits or vegetables daily? yes  Do you exercise regularly? no    Review of Systems   Constitutional: Negative for malaise/fatigue and weight loss. Eyes: Negative for blurred vision and double vision. Respiratory: Negative for cough and shortness of breath. Cardiovascular: Negative for chest pain, palpitations and leg swelling. Gastrointestinal: Negative for heartburn and nausea. Musculoskeletal: Negative for joint pain and myalgias. Skin: Negative for itching and rash. Neurological: Negative for dizziness, tingling, loss of consciousness, weakness and headaches. Endo/Heme/Allergies: Does not bruise/bleed easily. Psychiatric/Behavioral: Negative for depression. The patient is not nervous/anxious. Reviewed PmHx, FmHx, SocHx as well as meds and allergies, updated and dated in the chart. Patient Active Problem List   Diagnosis Code    Anxiety F41.9    Benign essential hypertension I10    Depressive disorder F32.9    Gout M10.9    Obesity, morbid ([facility] [address] E66.01     Past Medical History:   Diagnosis Date    Anemia     Anxiety     Back pain     Depression     Hypertension       Past Surgical History:   Procedure Laterality Date    HX TUBAL LIGATION       Allergies   Allergen Reactions    Tree And Shrub Pollen Unknown (comments)     Current Outpatient Medications   Medication Sig Dispense Refill    ALPRAZolam (XANAX) 0.5 mg tablet Take 1 Tablet by mouth daily as needed for Anxiety. 30 Tablet 3    amLODIPine (NORVASC) 2.5 mg tablet Take 1 Tablet by mouth daily. 90 Tablet 1    lisinopriL (PRINIVIL, ZESTRIL) 20 mg tablet Take 1 Tablet by mouth daily. 90 Tablet 1    PARoxetine (PAXIL) 20 mg tablet Take 2 Tablets by mouth daily.  180 Tablet 1     Family History   Problem Relation Age of Onset    Stroke Father     Heart Failure Brother     Hypertension Other     Depression Other     Anxiety Other     Anemia Other     Heart Failure Other     Diabetes Other         type 2     Social History     Tobacco Use    Smoking status: Never Smoker    Smokeless tobacco: Never Used   Substance Use Topics    Alcohol use: Not Currently         BP Readings from Last 3 Encounters:   11/01/21 124/70   08/03/20 126/84   12/23/19 140/87      Wt Readings from Last 3 Encounters:   11/01/21 254 lb (115.2 kg)   08/03/20 254 lb (115.2 kg)   12/23/19 237 lb (107.5 kg)     Body mass index is 42.27 kg/m². No exam data present  Physical Exam  Constitutional:       Appearance: She is obese. HENT:      Head: Normocephalic. Eyes:      Conjunctiva/sclera: Conjunctivae normal.   Cardiovascular:      Rate and Rhythm: Normal rate and regular rhythm. Pulses: Normal pulses. Heart sounds: Normal heart sounds. Pulmonary:      Effort: Pulmonary effort is normal.   Skin:     General: Skin is warm and dry. Neurological:      Mental Status: She is alert and oriented to person, place, and time. Psychiatric:         Mood and Affect: Mood normal.         Behavior: Behavior normal.       Was the patient's timed Up & Go test unsteady or longer than 30 seconds? no    Evaluation of Cognitive Function   Mood/affect:  neutral, happy  Orientation: Person, Place, Time and Situation  Appearance: age appropriate and casually dressed  Family member/caregiver input: None  Normal cognitive function during exam.  Specialists/Care Team   Peggy B. Ken Carrel has established care with the following healthcare providers:    Patient Care Team:  Carlos Manuel Torres NP as PCP - General (Nurse Practitioner)  Carlos Manuel Torres NP as PCP - [address] Topics with due status: Overdue       Topic Date Due    Hepatitis C Screening Never done    COVID-19 Vaccine Never done    Colorectal Cancer Screening Combo Never done    Shingrix Vaccine Age 50> Never done    Breast Cancer Screen Mammogram Never done    DTaP/Tdap/Td series 10/01/2010    Bone Densitometry (Dexa) Screening Never done    Pneumococcal 65+ years Never done    Medicare Yearly Exam Never done    Flu Vaccine Never done     Health Maintenance Topics with due status: Not Due       Topic Last Completion Date    Lipid Screen 03/17/2021         Colon cancer:  Cologuard orderd    Lung cancer (LDCT): Not indicated    Hepatitis C:  Recommendation: One time screening for all patient's aged 18-79. and Due, ordered    Diabetes:   Recommendation: USPSTF recommends screening ages 38-69 y/o if overweight or obese. Medicare covers screening in those patients who are overweight, obese, have HTN or dyslipiemia, a personal history of gestational diabetes or prior elevated blood sugar, a family hx of DM, or any patient over age 72 and Due, ordered    Lipids:   Recommendation: screening for hyperlipidemia every 5 years after age 39 and Due, ordered        PREVENTIVE CARE - FEMALE SCREENINGS     Cervical cancer: Recommendation: Every 3 yr from 21-29 and every 5 yr from 33-67, with Pap and HPV testing. and Not Indicated    Breast cancer: Recommendation:  USPSTF recommends screening mammography every 2 yr from 54-69. The decision to start screening mammography in women prior to age 48 years should be an individual one. Women who place a higher value on the potential benefit than the potential harms may choose to begin biennial screening between the ages of 36 and 52 years. Medicare covers annual screening mammography, USPSTF also recommends women with a personal or family history of breast, ovarian, tubal, or peritoneal cancer or who have an ancestry ([employer]ken[employer]) associated with breast cancer susceptibility 1 and 2 (BRCA1/2) gene mutations with an appropriate brief familial risk assessment tool.  Women with a positive result on the risk assessment tool should receive genetic counseling and, if indicated after counseling, genetic testing and mammogram was declined    Osteoporosis: Recommendation: Screen all women at 72, earlier if elevated risk and Declined    AAA:   Recommendation: One-time screening if family history of AAA and Not Indicated        IMMUNIZATIONS     Immunization History   Administered Date(s) Administered    Td 09/30/2010       Pneumovax:   Recommendation: PPSV23 once for all >65 and high risk <65  and Declined    Prevnar:   Recommendation: PCV13 only if >65 and immunocompromised or residing in a nursing home, or in areas of low childhood Pneumococcal vaccination and Declined    Influenza:   Recommendation: Vaccination annually, high dose if 72 or older and Declined    Shingrix:  Recommendation: Vaccination 2 shots 2-6 months apart for all age >47 and Declined    TDaP:    Recommendation: Vaccination Booster with TDaP every 10 yr. and Declined    Discussion of Advance Directive   Discussed with Joycelyn Sahni. Patrick Seth her ability to prepare and advance directive in the case that an injury or illness causes her to be unable to make health care decisions. Date of ACP Conversation: 11/01/21  Persons included in Conversation:  patient  Length of ACP Conversation in minutes:  <16 minutes (Non-Billable)    Authorized Decision Maker (if patient is incapable of making informed decisions): This person is:   Named in Advance Directive or Healthcare Power of             For Patients with Decision Making Capacity:   Values/Goals: Exploration of values, goals, and preferences if recovery is not expected, even with continued medical treatment in the event of:  Imminent death  Severe, permanent brain injury    Conversation Outcomes / Follow-Up Plan:   ACP in process - information provided, considering goals and options    Assessment/Plan   V70.0,     Diagnoses and all orders for this visit:    1. Medicare annual wellness visit, subsequent    2. Screen for colon cancer  -     COLOGUARD TEST (FECAL DNA COLORECTAL CANCER SCREENING); Future    3. Need for hepatitis C screening test  -     HEPATITIS C AB    4. Benign essential hypertension  Comments:  well controlled. She will monitor at home. Orders:  -     METABOLIC PANEL, COMPREHENSIVE  -     LIPID PANEL  -     amLODIPine (NORVASC) 2.5 mg tablet; Take 1 Tablet by mouth daily. -     lisinopriL (PRINIVIL, ZESTRIL) 20 mg tablet; Take 1 Tablet by mouth daily. 5. Anxiety  Comments:  xanax PRN. Orders:  -     ALPRAZolam (XANAX) 0.5 mg tablet; Take 1 Tablet by mouth daily as needed for Anxiety. 6. Depressive disorder  Comments:  stable on paxil  Orders:  -     PARoxetine (PAXIL) 20 mg tablet; Take 2 Tablets by mouth daily.         Last PDMP Marnie Disla as Reviewed:  Review User Review Instant Review Result   Warner Harp 11/1/2021  4:11 PM Reviewed PDMP [1]           Anahy Del Cid NP
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
## 2021-11-02 LAB
ALBUMIN SERPL-MCNC: 4.3 G/DL (ref 3.8–4.8)
ALBUMIN/GLOB SERPL: 1.6 {RATIO} (ref 1.2–2.2)
ALP SERPL-CCNC: 98 IU/L (ref 44–121)
ALT SERPL-CCNC: 23 IU/L (ref 0–32)
AST SERPL-CCNC: 21 IU/L (ref 0–40)
BILIRUB SERPL-MCNC: 0.3 MG/DL (ref 0–1.2)
BUN SERPL-MCNC: 13 MG/DL (ref 8–27)
BUN/CREAT SERPL: 19 (ref 12–28)
CALCIUM SERPL-MCNC: 9.1 MG/DL (ref 8.7–10.3)
CHLORIDE SERPL-SCNC: 103 MMOL/L (ref 96–106)
CHOLEST SERPL-MCNC: 168 MG/DL (ref 100–199)
CO2 SERPL-SCNC: 27 MMOL/L (ref 20–29)
CREAT SERPL-MCNC: 0.68 MG/DL (ref 0.57–1)
GLOBULIN SER CALC-MCNC: 2.7 G/DL (ref 1.5–4.5)
GLUCOSE SERPL-MCNC: 84 MG/DL (ref 65–99)
HCV AB S/CO SERPL IA: 0.2 S/CO RATIO (ref 0–0.9)
HDLC SERPL-MCNC: 58 MG/DL
LDLC SERPL CALC-MCNC: 78 MG/DL (ref 0–99)
POTASSIUM SERPL-SCNC: 4.4 MMOL/L (ref 3.5–5.2)
PROT SERPL-MCNC: 7 G/DL (ref 6–8.5)
SODIUM SERPL-SCNC: 141 MMOL/L (ref 134–144)
TRIGL SERPL-MCNC: 194 MG/DL (ref 0–149)
VLDLC SERPL CALC-MCNC: 32 MG/DL (ref 5–40)

## 2021-11-02 NOTE — PROGRESS NOTES
Lab show triglycerdies are a little elevated so be mind of diet, increasing health fats/omega 3 (think fish, avocado, olive oil, whole grains) can help lower this number. Otherwise lab work looks good!

## 2022-03-18 PROBLEM — F41.9 ANXIETY: Status: ACTIVE | Noted: 2020-07-13

## 2022-03-18 PROBLEM — F32.A DEPRESSIVE DISORDER: Status: ACTIVE | Noted: 2020-07-13

## 2022-03-19 PROBLEM — E66.01 OBESITY, MORBID (HCC): Status: ACTIVE | Noted: 2020-08-03

## 2022-03-19 PROBLEM — I10 BENIGN ESSENTIAL HYPERTENSION: Status: ACTIVE | Noted: 2020-07-13

## 2022-03-20 PROBLEM — M10.9 GOUT: Status: ACTIVE | Noted: 2020-07-13

## 2022-05-17 DIAGNOSIS — F32.A DEPRESSIVE DISORDER: ICD-10-CM

## 2022-05-18 RX ORDER — PAROXETINE HYDROCHLORIDE 20 MG/1
40 TABLET, FILM COATED ORAL DAILY
Qty: 180 TABLET | Refills: 0 | Status: SHIPPED | OUTPATIENT
Start: 2022-05-18 | End: 2022-06-15 | Stop reason: SDUPTHER

## 2022-05-26 ENCOUNTER — VIRTUAL VISIT (OUTPATIENT)
Dept: PRIMARY CARE CLINIC | Age: 72
End: 2022-05-26

## 2022-05-26 DIAGNOSIS — I10 BENIGN ESSENTIAL HYPERTENSION: ICD-10-CM

## 2022-05-26 DIAGNOSIS — F41.9 ANXIETY: ICD-10-CM

## 2022-05-26 RX ORDER — AMLODIPINE BESYLATE 2.5 MG/1
2.5 TABLET ORAL DAILY
Qty: 90 TABLET | Refills: 0 | Status: SHIPPED | OUTPATIENT
Start: 2022-05-26 | End: 2022-06-15 | Stop reason: SDUPTHER

## 2022-05-26 RX ORDER — ALPRAZOLAM 0.5 MG/1
0.5 TABLET ORAL
Qty: 30 TABLET | Refills: 0 | Status: SHIPPED | OUTPATIENT
Start: 2022-05-26 | End: 2022-06-15 | Stop reason: SDUPTHER

## 2022-05-26 RX ORDER — LISINOPRIL 20 MG/1
20 TABLET ORAL DAILY
Qty: 90 TABLET | Refills: 0 | Status: SHIPPED | OUTPATIENT
Start: 2022-05-26 | End: 2022-06-15 | Stop reason: SDUPTHER

## 2022-05-26 NOTE — PROGRESS NOTES
Last PDMP Candace isabel Reviewed:  Review User Review Instant Review Result   Daryn Saliva 5/26/2022  2:00 PM Reviewed PDMP [1]

## 2022-05-26 NOTE — PROGRESS NOTES
Chief Complaint   Patient presents with    Follow Up Chronic Condition     Pt is asking refills all meds       1. Have you been to the ER, urgent care clinic since your last visit? Hospitalized since your last visit?no    2. Have you seen or consulted any other health care providers outside of the [address] [place] [address] since your last visit? Include any pap smears or colon screening. no    There were no vitals taken for this visit.

## 2022-06-15 ENCOUNTER — OFFICE VISIT (OUTPATIENT)
Dept: PRIMARY CARE CLINIC | Age: 72
End: 2022-06-15
Payer: MEDICARE

## 2022-06-15 VITALS
SYSTOLIC BLOOD PRESSURE: 130 MMHG | RESPIRATION RATE: 18 BRPM | OXYGEN SATURATION: 96 % | HEART RATE: 72 BPM | HEIGHT: 65 IN | DIASTOLIC BLOOD PRESSURE: 75 MMHG | TEMPERATURE: 97.1 F | WEIGHT: 248 LBS | BODY MASS INDEX: 41.32 KG/M2

## 2022-06-15 DIAGNOSIS — F32.A DEPRESSIVE DISORDER: ICD-10-CM

## 2022-06-15 DIAGNOSIS — E66.01 OBESITY, CLASS III, BMI 40-49.9 (MORBID OBESITY) (HCC): ICD-10-CM

## 2022-06-15 DIAGNOSIS — F41.9 ANXIETY: ICD-10-CM

## 2022-06-15 DIAGNOSIS — I10 BENIGN ESSENTIAL HYPERTENSION: ICD-10-CM

## 2022-06-15 PROCEDURE — G8400 PT W/DXA NO RESULTS DOC: HCPCS | Performed by: NURSE PRACTITIONER

## 2022-06-15 PROCEDURE — G8417 CALC BMI ABV UP PARAM F/U: HCPCS | Performed by: NURSE PRACTITIONER

## 2022-06-15 PROCEDURE — 1090F PRES/ABSN URINE INCON ASSESS: CPT | Performed by: NURSE PRACTITIONER

## 2022-06-15 PROCEDURE — 99214 OFFICE O/P EST MOD 30 MIN: CPT | Performed by: NURSE PRACTITIONER

## 2022-06-15 PROCEDURE — 3017F COLORECTAL CA SCREEN DOC REV: CPT | Performed by: NURSE PRACTITIONER

## 2022-06-15 PROCEDURE — G9717 DOC PT DX DEP/BP F/U NT REQ: HCPCS | Performed by: NURSE PRACTITIONER

## 2022-06-15 PROCEDURE — G8752 SYS BP LESS 140: HCPCS | Performed by: NURSE PRACTITIONER

## 2022-06-15 PROCEDURE — G8754 DIAS BP LESS 90: HCPCS | Performed by: NURSE PRACTITIONER

## 2022-06-15 PROCEDURE — G8536 NO DOC ELDER MAL SCRN: HCPCS | Performed by: NURSE PRACTITIONER

## 2022-06-15 PROCEDURE — G8427 DOCREV CUR MEDS BY ELIG CLIN: HCPCS | Performed by: NURSE PRACTITIONER

## 2022-06-15 PROCEDURE — 1123F ACP DISCUSS/DSCN MKR DOCD: CPT | Performed by: NURSE PRACTITIONER

## 2022-06-15 PROCEDURE — 1101F PT FALLS ASSESS-DOCD LE1/YR: CPT | Performed by: NURSE PRACTITIONER

## 2022-06-15 RX ORDER — LISINOPRIL 20 MG/1
20 TABLET ORAL DAILY
Qty: 90 TABLET | Refills: 1 | Status: SHIPPED | OUTPATIENT
Start: 2022-06-15

## 2022-06-15 RX ORDER — PAROXETINE HYDROCHLORIDE 20 MG/1
40 TABLET, FILM COATED ORAL DAILY
Qty: 180 TABLET | Refills: 1 | Status: SHIPPED | OUTPATIENT
Start: 2022-06-15

## 2022-06-15 RX ORDER — ALPRAZOLAM 0.5 MG/1
0.5 TABLET ORAL
Qty: 30 TABLET | Refills: 2 | Status: SHIPPED | OUTPATIENT
Start: 2022-06-15

## 2022-06-15 RX ORDER — AMLODIPINE BESYLATE 2.5 MG/1
2.5 TABLET ORAL DAILY
Qty: 90 TABLET | Refills: 1 | Status: SHIPPED | OUTPATIENT
Start: 2022-06-15

## 2022-06-15 NOTE — PROGRESS NOTES
Chief Complaint   Patient presents with    Follow Up Chronic Condition     pt is asking for refills on all meds        1. Have you been to the ER, urgent care clinic since your last visit? Hospitalized since your last visit?no    2. Have you seen or consulted any other health care providers outside of the [address] [place] [address] since your last visit? Include any pap smears or colon screening.  no    Visit Vitals  /75 (BP 1 Location: Right arm, BP Patient Position: At rest, BP Cuff Size: Adult)   Pulse 72   Temp 97.1 °F (36.2 °C) (Temporal)   Resp 18   Ht 5' 5\" (1.651 m)   Wt 248 lb (112.5 kg)   SpO2 96%   BMI 41.27 kg/m²

## 2022-06-15 NOTE — PROGRESS NOTES
HISTORY OF PRESENT ILLNESS  Mick Reyez is a 73 y.o. female presents for follow up HTN and anxiety. Hypertension: Patients hypertension is well controlled on regimen of lisinopirl and amlodipine. Denies headaches, blurred vision or dizziness. Depression/Anxiety:  Patient notes that their anxiety is well controlled on paxil. Patient uses xanax PRN. Denies SI/HI. Declines colonoscopy and mammogram.   Vitals:    06/15/22 1501   BP: 130/75   Pulse: 72   Resp: 18   Temp: 97.1 °F (36.2 °C)   TempSrc: Temporal   SpO2: 96%   Weight: 248 lb (112.5 kg)   Height: 5' 5\" (1.651 m)     Patient Active Problem List   Diagnosis Code    Anxiety F41.9    Benign essential hypertension I10    Depressive disorder F32. A    Gout M10.9    Obesity, morbid ([facility] E66.01     Patient Active Problem List    Diagnosis Date Noted    Obesity, morbid ([facility] 08/03/2020    Anxiety 07/13/2020    Benign essential hypertension 07/13/2020    Depressive disorder 07/13/2020    Gout 07/13/2020     Current Outpatient Medications   Medication Sig Dispense Refill    PARoxetine (PAXIL) 20 mg tablet Take 2 Tablets by mouth daily. 180 Tablet 1    amLODIPine (NORVASC) 2.5 mg tablet Take 1 Tablet by mouth daily. 90 Tablet 1    lisinopriL (PRINIVIL, ZESTRIL) 20 mg tablet Take 1 Tablet by mouth daily. 90 Tablet 1    ALPRAZolam (XANAX) 0.5 mg tablet Take 1 Tablet by mouth daily as needed for Anxiety.  30 Tablet 2     Allergies   Allergen Reactions    Tree And Shrub Pollen Unknown (comments)     Past Medical History:   Diagnosis Date    Anemia     Anxiety     Back pain     Depression     Hypertension      Past Surgical History:   Procedure Laterality Date    HX TUBAL LIGATION       Family History   Problem Relation Age of Onset    Stroke Father     Heart Failure Brother     Hypertension Other     Depression Other     Anxiety Other     Anemia Other     Heart Failure Other     Diabetes Other         type 2     Social History Tobacco Use    Smoking status: Never Smoker    Smokeless tobacco: Never Used   Substance Use Topics    Alcohol use: Not Currently           Review of Systems   Constitutional: Negative for malaise/fatigue and weight loss. Eyes: Negative for blurred vision and double vision. Respiratory: Negative for shortness of breath. Cardiovascular: Negative for chest pain and palpitations. Neurological: Negative for dizziness, tingling, tremors and headaches. Psychiatric/Behavioral: The patient is not nervous/anxious and does not have insomnia. Physical Exam  Constitutional:       Appearance: Normal appearance. She is obese. HENT:      Head: Normocephalic. Eyes:      Extraocular Movements: Extraocular movements intact. Conjunctiva/sclera: Conjunctivae normal.      Pupils: Pupils are equal, round, and reactive to light. Cardiovascular:      Rate and Rhythm: Normal rate and regular rhythm. Pulses: Normal pulses. Heart sounds: Normal heart sounds. Pulmonary:      Effort: Pulmonary effort is normal.      Breath sounds: Normal breath sounds. Musculoskeletal:         General: Normal range of motion. Cervical back: Normal range of motion and neck supple. Skin:     General: Skin is warm and dry. Neurological:      General: No focal deficit present. Mental Status: She is alert and oriented to person, place, and time. Psychiatric:         Mood and Affect: Mood normal.           ASSESSMENT and PLAN  Diagnoses and all orders for this visit:    1. Depressive disorder  Comments:  stable on paxil  Orders:  -     PARoxetine (PAXIL) 20 mg tablet; Take 2 Tablets by mouth daily.  -     CBC WITH AUTOMATED DIFF    2. Benign essential hypertension  Comments:  well controlled. She will monitor at home. Orders:  -     amLODIPine (NORVASC) 2.5 mg tablet; Take 1 Tablet by mouth daily. -     lisinopriL (PRINIVIL, ZESTRIL) 20 mg tablet;  Take 1 Tablet by mouth daily.  -     METABOLIC PANEL, COMPREHENSIVE  -     LIPID PANEL    3. Anxiety  Comments:  xanax PRN. Orders:  -     ALPRAZolam (XANAX) 0.5 mg tablet; Take 1 Tablet by mouth daily as needed for Anxiety. 4. Obesity, Class III, BMI 40-49.9 (morbid obesity) ([facility]  Comments:  working on lifestyle changes. Patient has lost 10 lbs. Encouraged to continue lifestyle changes.           Florence Campos, NP

## 2022-06-16 LAB
ALBUMIN SERPL-MCNC: 4.3 G/DL (ref 3.8–4.8)
ALBUMIN/GLOB SERPL: 1.6 {RATIO} (ref 1.2–2.2)
ALP SERPL-CCNC: 107 IU/L (ref 44–121)
ALT SERPL-CCNC: 22 IU/L (ref 0–32)
AST SERPL-CCNC: 19 IU/L (ref 0–40)
BASOPHILS # BLD AUTO: 0.1 X10E3/UL (ref 0–0.2)
BASOPHILS NFR BLD AUTO: 1 %
BILIRUB SERPL-MCNC: 0.5 MG/DL (ref 0–1.2)
BUN SERPL-MCNC: 15 MG/DL (ref 8–27)
BUN/CREAT SERPL: 21 (ref 12–28)
CALCIUM SERPL-MCNC: 9.5 MG/DL (ref 8.7–10.3)
CHLORIDE SERPL-SCNC: 103 MMOL/L (ref 96–106)
CHOLEST SERPL-MCNC: 169 MG/DL (ref 100–199)
CO2 SERPL-SCNC: 25 MMOL/L (ref 20–29)
CREAT SERPL-MCNC: 0.7 MG/DL (ref 0.57–1)
EGFR: 94 ML/MIN/1.73
EOSINOPHIL # BLD AUTO: 0.4 X10E3/UL (ref 0–0.4)
EOSINOPHIL NFR BLD AUTO: 4 %
ERYTHROCYTE [DISTWIDTH] IN BLOOD BY AUTOMATED COUNT: 13.5 % (ref 11.7–15.4)
GLOBULIN SER CALC-MCNC: 2.7 G/DL (ref 1.5–4.5)
GLUCOSE SERPL-MCNC: 89 MG/DL (ref 65–99)
HCT VFR BLD AUTO: 40.9 % (ref 34–46.6)
HDLC SERPL-MCNC: 53 MG/DL
HGB BLD-MCNC: 13.5 G/DL (ref 11.1–15.9)
IMM GRANULOCYTES # BLD AUTO: 0.2 X10E3/UL (ref 0–0.1)
IMM GRANULOCYTES NFR BLD AUTO: 2 %
LDLC SERPL CALC-MCNC: 88 MG/DL (ref 0–99)
LYMPHOCYTES # BLD AUTO: 2.7 X10E3/UL (ref 0.7–3.1)
LYMPHOCYTES NFR BLD AUTO: 24 %
MCH RBC QN AUTO: 28.9 PG (ref 26.6–33)
MCHC RBC AUTO-ENTMCNC: 33 G/DL (ref 31.5–35.7)
MCV RBC AUTO: 88 FL (ref 79–97)
MONOCYTES # BLD AUTO: 1.1 X10E3/UL (ref 0.1–0.9)
MONOCYTES NFR BLD AUTO: 10 %
NEUTROPHILS # BLD AUTO: 6.5 X10E3/UL (ref 1.4–7)
NEUTROPHILS NFR BLD AUTO: 59 %
PLATELET # BLD AUTO: 324 X10E3/UL (ref 150–450)
POTASSIUM SERPL-SCNC: 4.3 MMOL/L (ref 3.5–5.2)
PROT SERPL-MCNC: 7 G/DL (ref 6–8.5)
RBC # BLD AUTO: 4.67 X10E6/UL (ref 3.77–5.28)
SODIUM SERPL-SCNC: 143 MMOL/L (ref 134–144)
TRIGL SERPL-MCNC: 165 MG/DL (ref 0–149)
VLDLC SERPL CALC-MCNC: 28 MG/DL (ref 5–40)
WBC # BLD AUTO: 11.1 X10E3/UL (ref 3.4–10.8)

## 2022-06-16 NOTE — PROGRESS NOTES
Lab work shows that triglycerides are elevated, continuing to make changes to diet to help. Including more healthy fats and omega 3s in diet can help (think fish, olive oil, nuts, avocado, whole grains). Otherwise, lab work looks great.

## 2022-11-07 DIAGNOSIS — F41.9 ANXIETY: ICD-10-CM

## 2022-11-08 RX ORDER — ALPRAZOLAM 0.5 MG/1
TABLET ORAL
Qty: 30 TABLET | Refills: 0 | Status: SHIPPED | OUTPATIENT
Start: 2022-11-08

## 2022-12-08 ENCOUNTER — VIRTUAL VISIT (OUTPATIENT)
Dept: PRIMARY CARE CLINIC | Age: 72
End: 2022-12-08
Payer: MEDICARE

## 2022-12-08 DIAGNOSIS — F32.A DEPRESSIVE DISORDER: ICD-10-CM

## 2022-12-08 DIAGNOSIS — F41.9 ANXIETY: Chronic | ICD-10-CM

## 2022-12-08 DIAGNOSIS — I10 BENIGN ESSENTIAL HYPERTENSION: ICD-10-CM

## 2022-12-08 DIAGNOSIS — Z00.00 MEDICARE ANNUAL WELLNESS VISIT, SUBSEQUENT: Primary | ICD-10-CM

## 2022-12-08 RX ORDER — BUSPIRONE HYDROCHLORIDE 5 MG/1
5 TABLET ORAL 2 TIMES DAILY
Qty: 60 TABLET | Refills: 5 | Status: SHIPPED | OUTPATIENT
Start: 2022-12-08

## 2022-12-08 RX ORDER — PAROXETINE HYDROCHLORIDE 20 MG/1
40 TABLET, FILM COATED ORAL DAILY
Qty: 180 TABLET | Refills: 1 | Status: SHIPPED | OUTPATIENT
Start: 2022-12-08

## 2022-12-08 RX ORDER — AMLODIPINE BESYLATE 2.5 MG/1
2.5 TABLET ORAL DAILY
Qty: 90 TABLET | Refills: 1 | Status: SHIPPED | OUTPATIENT
Start: 2022-12-08

## 2022-12-08 RX ORDER — ALPRAZOLAM 0.5 MG/1
TABLET ORAL
Qty: 30 TABLET | Refills: 2 | Status: SHIPPED | OUTPATIENT
Start: 2022-12-08

## 2022-12-08 RX ORDER — LISINOPRIL 20 MG/1
20 TABLET ORAL DAILY
Qty: 90 TABLET | Refills: 1 | Status: SHIPPED | OUTPATIENT
Start: 2022-12-08

## 2022-12-08 NOTE — PROGRESS NOTES
Pati Leslie is a 81 y.o. female presents for Medicare wellness visit. This is a Subsequent Medicare Annual Wellness Visit (AWV), (Performed more than 12 months after effective date of Medicare Part B enrollment and 12 months after last preventive visit.)    I have reviewed the patient's medical history in detail and updated the computerized patient record. History obtained from: the patient. female  81 y.o. WHITE/NON-  Depression Risk Factor Screening:     3 most recent PHQ Screens 12/8/2022   Little interest or pleasure in doing things Not at all   Feeling down, depressed, irritable, or hopeless Not at all   Total Score PHQ 2 0          Fall Risk Factor Screening:     Fall Risk Assessment, last 12 mths 6/15/2022   Able to walk? Yes   Fall in past 12 months? 0   Do you feel unsteady? 0   Are you worried about falling 0       Alcohol Risk Screen    Do you average more than 1 drink per night or more than 7 drinks a week:  No    On any one occasion in the past three months have you have had more than 3 drinks containing alcohol:  No         Functional Ability and Level of Safety:    Hearing: Hearing is good. Activities of Daily Living: The home contains: no safety equipment. Patient does total self care      Ambulation: with no difficulty      Abuse Screen:  Patient is not abused         History and Pertinent Exam   Patient is due for chronic medical conditions and/or has acute concerns in addition to the medicare wellness visit and agrees to do both, understanding there may be a copay for the additional services provided. Other Concerns today: See acute note  Health & Diet:   Please describe your diet habits: Regular diet. Do you get 5 servings of fruits or vegetables daily? yes  Do you exercise regularly? no    ROS  Reviewed PmHx, FmHx, SocHx as well as meds and allergies, updated and dated in the chart. Patient Active Problem List   Diagnosis Code    Anxiety F41. 9    Benign essential hypertension I10    Depressive disorder F32. A    Gout M10.9    Obesity, morbid ([facility] E66.01     Past Medical History:   Diagnosis Date    Anemia     Anxiety     Back pain     Depression     Hypertension       Past Surgical History:   Procedure Laterality Date    HX TUBAL LIGATION       Allergies   Allergen Reactions    Tree And Shrub Pollen Unknown (comments)     Current Outpatient Medications   Medication Sig Dispense Refill    PARoxetine (PAXIL) 20 mg tablet Take 2 Tablets by mouth daily. 180 Tablet 1    ALPRAZolam (XANAX) 0.5 mg tablet TAKE 1 TABLET BY MOUTH ONCE DAILY AS NEEDED FOR ANXIETY 30 Tablet 2    busPIRone (BUSPAR) 5 mg tablet Take 1 Tablet by mouth two (2) times a day. 60 Tablet 5    amLODIPine (NORVASC) 2.5 mg tablet Take 1 Tablet by mouth daily. 90 Tablet 1    lisinopriL (PRINIVIL, ZESTRIL) 20 mg tablet Take 1 Tablet by mouth daily. 80 Tablet 1     Family History   Problem Relation Age of Onset    Stroke Father     Heart Failure Brother     Hypertension Other     Depression Other     Anxiety Other     Anemia Other     Heart Failure Other     Diabetes Other         type 2     Social History     Tobacco Use    Smoking status: Never    Smokeless tobacco: Never   Substance Use Topics    Alcohol use: Not Currently         BP Readings from Last 3 Encounters:   06/15/22 130/75   11/01/21 124/70   08/03/20 126/84      Wt Readings from Last 3 Encounters:   06/15/22 248 lb (112.5 kg)   11/01/21 254 lb (115.2 kg)   08/03/20 254 lb (115.2 kg)     There is no height or weight on file to calculate BMI. No results found. Physical Exam      Evaluation of Cognitive Function   Mood/affect:  neutral, happy  Orientation: Person, Place, Time and Situation  Appearance: age appropriate and casually dressed  Family member/caregiver input: None  Normal cognitive function during exam.  Specialists/Care Team   Sana Claudio has established care with the following healthcare providers:    Patient Care Team:  Kurt Stokes NP as PCP - General (Nurse Practitioner)  Kurt Stokes NP as PCP - [facility]      [address] [place] [address] Maintenance Topics with due status: Overdue       Topic Date Due    COVID-19 Vaccine Never done    Colorectal Cancer Screening Combo Never done    Shingrix Vaccine Age 50> Never done    Breast Cancer Screen Mammogram Never done    DTaP/Tdap/Td series 10/01/2010    Bone Densitometry (Dexa) Screening Never done    Pneumococcal 65+ years Never done    Flu Vaccine Never done     Health Maintenance Topics with due status: Not Due       Topic Last Completion Date    Depression Monitoring 06/15/2022    Lipid Screen 06/15/2022    Medicare Yearly Exam 12/08/2022     Health Maintenance Topics with due status: Completed       Topic Last Completion Date    Hepatitis C Screening 11/01/2021         Colon cancer:  Declined    Lung cancer (LDCT): Not indicated    Hepatitis C:  Up to date    Diabetes:   Up to date    Lipids:   Up to date        PREVENTIVE CARE - FEMALE SCREENINGS     Cervical cancer: Recommendation: Every 3 yr from 21-29 and every 5 yr from 33-67, with Pap and HPV testing. and Not Indicated    Breast cancer: Recommendation:  USPSTF recommends screening mammography every 2 yr from 54-69. The decision to start screening mammography in women prior to age 48 years should be an individual one. Women who place a higher value on the potential benefit than the potential harms may choose to begin biennial screening between the ages of 36 and 52 years. Medicare covers annual screening mammography, USPSTF also recommends women with a personal or family history of breast, ovarian, tubal, or peritoneal cancer or who have an ancestry (Ashkenazi Restorationist) associated with breast cancer susceptibility 1 and 2 (BRCA1/2) gene mutations with an appropriate brief familial risk assessment tool.  Women with a positive result on the risk assessment tool should receive genetic counseling and, if indicated after counseling, genetic testing and mammogram was declined    Osteoporosis: Recommendation: Screen all women at 72, earlier if elevated risk and Declined    AAA:   Recommendation: One-time screening if family history of AAA and Not Indicated        IMMUNIZATIONS     Immunization History   Administered Date(s) Administered    Td 09/30/2010       Pneumovax:   Recommendation: PPSV23 once for all >65 and high risk <65  and Declined    Prevnar:   Recommendation: PCV13 only if >65 and immunocompromised or residing in a nursing home, or in areas of low childhood Pneumococcal vaccination and Declined    Influenza:   Recommendation: Vaccination annually, high dose if 72 or older and Declined    Shingrix:  Recommendation: Vaccination 2 shots 2-6 months apart for all age >47 and Declined    TDaP:    Recommendation: Vaccination Booster with TDaP every 10 yr. and Declined    Discussion of Advance Directive   Discussed with Tiago Newman Ree her ability to prepare and advance directive in the case that an injury or illness causes her to be unable to make health care decisions. Date of ACP Conversation: 12/08/22  Persons included in Conversation:  patient  Length of ACP Conversation in minutes:  <16 minutes (Non-Billable)    Authorized Decision Maker (if patient is incapable of making informed decisions): This person is:   Named in Advance Directive or Healthcare Power of             For Patients with Decision Making Capacity:   Values/Goals: Exploration of values, goals, and preferences if recovery is not expected, even with continued medical treatment in the event of:  Imminent death  Severe, permanent brain injury    Conversation Outcomes / Follow-Up Plan:   ACP in process - information provided, considering goals and options    Assessment/Plan   V70.0,     Diagnoses and all orders for this visit:    1. Medicare annual wellness visit, subsequent    2.  Depressive disorder  Comments:  stable on paxil. Orders:  -     PARoxetine (PAXIL) 20 mg tablet; Take 2 Tablets by mouth daily. 3. Anxiety  Comments:  chronic, not well controlled. Discussed reasonings for why we are avoiding daily use of xanax   try on buspar for anxiety which is safer alternative. Orders:  -     ALPRAZolam (XANAX) 0.5 mg tablet; TAKE 1 TABLET BY MOUTH ONCE DAILY AS NEEDED FOR ANXIETY  -     busPIRone (BUSPAR) 5 mg tablet; Take 1 Tablet by mouth two (2) times a day. 4. Benign essential hypertension  Comments:  well controlled. She will monitor at home. Orders:  -     amLODIPine (NORVASC) 2.5 mg tablet; Take 1 Tablet by mouth daily. -     lisinopriL (PRINIVIL, ZESTRIL) 20 mg tablet; Take 1 Tablet by mouth daily. Last PDMP Kara March as Reviewed:  Review User Review Instant Review Result   Inell Hoa 12/8/2022 11:17 AM Reviewed PDMP [1]       Follow-up and Dispositions    Return for Chronic OV.          Heidi Ogden NP

## 2022-12-08 NOTE — PROGRESS NOTES
HISTORY OF PRESENT ILLNESS  Jossy Fontaine is a 81 y.o. female presents via telemedicine for follow up on chronic conditions. Hypertension: Patients hypertension is well controlled on regimen of lisinopirl and amlodipine. Denies headaches, blurred vision or dizziness. Depression/Anxiety:  Patient notes that their anxiety is well controlled on paxil. Patient reported that she marly easy and reported that she would like to have more xanax to use as she feels that it is the only thing that helps her stop crying. Patient reported that since we reduce use of xanax from daily use to PRN she feels overwhelmed. She is taking care of her mother at her house. There were no vitals filed for this visit. Patient Active Problem List   Diagnosis Code    Anxiety F41. 9    Benign essential hypertension I10    Depressive disorder F32. A    Gout M10.9    Obesity, morbid ([facility] [address] E66.01     Patient Active Problem List    Diagnosis Date Noted    Obesity, morbid ([facility] [address] 08/03/2020    Anxiety 07/13/2020    Benign essential hypertension 07/13/2020    Depressive disorder 07/13/2020    Gout 07/13/2020     Current Outpatient Medications   Medication Sig Dispense Refill    PARoxetine (PAXIL) 20 mg tablet Take 2 Tablets by mouth daily. 180 Tablet 1    ALPRAZolam (XANAX) 0.5 mg tablet TAKE 1 TABLET BY MOUTH ONCE DAILY AS NEEDED FOR ANXIETY 30 Tablet 2    busPIRone (BUSPAR) 5 mg tablet Take 1 Tablet by mouth two (2) times a day. 60 Tablet 5    amLODIPine (NORVASC) 2.5 mg tablet Take 1 Tablet by mouth daily. 90 Tablet 1    lisinopriL (PRINIVIL, ZESTRIL) 20 mg tablet Take 1 Tablet by mouth daily.  90 Tablet 1     Allergies   Allergen Reactions    Tree [employer] Shrub Pollen Unknown (comments)     Past Medical History:   Diagnosis Date    Anemia     Anxiety     Back pain     Depression     Hypertension      Past Surgical History:   Procedure Laterality Date    HX TUBAL LIGATION       Family History   Problem Relation Age of Onset    Stroke Father     Heart Failure Brother     Hypertension Other     Depression Other     Anxiety Other     Anemia Other     Heart Failure Other     Diabetes Other         type 2     Social History     Tobacco Use    Smoking status: Never    Smokeless tobacco: Never   Substance Use Topics    Alcohol use: Not Currently           Review of Systems   Constitutional:  Negative for malaise/fatigue and weight loss. Eyes:  Negative for blurred vision and double vision. Respiratory:  Negative for shortness of breath. Cardiovascular:  Negative for chest pain and palpitations. Neurological:  Negative for dizziness, tingling, tremors and headaches. Psychiatric/Behavioral:  The patient is nervous/anxious. The patient does not have insomnia. Physical Exam  Constitutional:       Appearance: Normal appearance. HENT:      Head: Normocephalic. Eyes:      Conjunctiva/sclera: Conjunctivae normal.   Pulmonary:      Effort: Pulmonary effort is normal.   Skin:     General: Skin is dry. Neurological:      Mental Status: She is alert and oriented to person, place, and time. Psychiatric:         Mood and Affect: Mood normal.         Behavior: Behavior normal.         ASSESSMENT and PLAN  Diagnoses and all orders for this visit:    1. Medicare annual wellness visit, subsequent    2. Depressive disorder  Comments:  stable on paxil. Orders:  -     PARoxetine (PAXIL) 20 mg tablet; Take 2 Tablets by mouth daily. 3. Anxiety  Comments:  chronic, not well controlled. Discussed reasonings for why we are avoiding daily use of xanax   try on buspar for anxiety which is safer alternative. Orders:  -     ALPRAZolam (XANAX) 0.5 mg tablet; TAKE 1 TABLET BY MOUTH ONCE DAILY AS NEEDED FOR ANXIETY  -     busPIRone (BUSPAR) 5 mg tablet; Take 1 Tablet by mouth two (2) times a day. 4. Benign essential hypertension  Comments:  well controlled. She will monitor at home. Orders:  -     amLODIPine (NORVASC) 2.5 mg tablet;  Take 1 Tablet by mouth daily. -     lisinopriL (PRINIVIL, ZESTRIL) 20 mg tablet; Take 1 Tablet by mouth daily. Last PDMP Quentin Llamas as Reviewed:  Review User Review Instant Review Result   Jason Melo 12/8/2022 11:17 AM Reviewed PDMP [1]         Fermin Mckee, who was evaluated through a synchronous (real-time) audio-video encounter, and/or her healthcare decision maker, is aware that it is a billable service, with coverage as determined by her insurance carrier. She provided verbal consent to proceed: Yes, and patient identification was verified. It was conducted pursuant to the emergency declaration under the [address] [place] [address] and the [employer] Act. A caregiver was present when appropriate. Ability to conduct physical exam was limited. I was at home. The patient was at home. Fermin Mckee is a 81 y.o. female being evaluated by a Virtual Visit (video visit) encounter to address concerns as mentioned above. A caregiver was present when appropriate. Due to this being a TeleHealth encounter (During U[employer]- public health emergency), evaluation of the following organ systems was limited: Vitals/Constitutional/EENT/Resp/CV/GI//MS/Neuro/Skin/Heme-Lymph-Imm. Pursuant to the emergency declaration under the [address] [place] [address] and the [employer] Act, this Virtual Visit was conducted with patient's (and/or legal guardian's) consent, to reduce the risk of exposure to COVID-19 and provide necessary medical care. Services were provided through a video synchronous discussion virtually to substitute for in-person encounter. --Darryl Escobar NP on 12/8/2022 at 10:38 AM    An electronic signature was used to authenticate this note.

## 2022-12-08 NOTE — PROGRESS NOTES
Chief Complaint   Patient presents with    Medication Refill       There were no vitals taken for this visit. 1. Have you been to the ER, urgent care clinic since your last visit? Hospitalized since your last visit? No    2. Have you seen or consulted any other health care providers outside of the [address] [place] [address] since your last visit? Include any pap smears or colon screening.  No

## 2023-03-04 ENCOUNTER — HOSPITAL ENCOUNTER (OUTPATIENT)
Age: 73
Setting detail: OBSERVATION
Discharge: HOME | End: 2023-03-06
Attending: EMERGENCY MEDICINE | Admitting: SURGERY
Payer: MEDICARE

## 2023-03-04 ENCOUNTER — APPOINTMENT (OUTPATIENT)
Dept: CT IMAGING | Age: 73
End: 2023-03-04
Attending: EMERGENCY MEDICINE
Payer: MEDICARE

## 2023-03-04 ENCOUNTER — APPOINTMENT (OUTPATIENT)
Dept: GENERAL RADIOLOGY | Age: 73
End: 2023-03-04
Attending: EMERGENCY MEDICINE
Payer: MEDICARE

## 2023-03-04 DIAGNOSIS — S91.012A LACERATION OF LEFT ANKLE, INITIAL ENCOUNTER: ICD-10-CM
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
## 2023-03-04 PROBLEM — R58 HEMORRHAGE: Status: ACTIVE | Noted: 2023-03-04

## 2023-03-04 LAB
ABO + RH BLD: NORMAL
ALBUMIN SERPL-MCNC: 3.8 G/DL (ref 3.5–5)
ALBUMIN/GLOB SERPL: 0.9 (ref 1.1–2.2)
ALP SERPL-CCNC: 82 U/L (ref 45–117)
ALT SERPL-CCNC: 26 U/L (ref 12–78)
AMPHET UR QL SCN: NEGATIVE
ANION GAP SERPL CALC-SCNC: 10 MMOL/L (ref 5–15)
APPEARANCE UR: CLEAR
APTT PPP: 27 SEC (ref 21.2–34.1)
AST SERPL W P-5'-P-CCNC: 46 U/L (ref 15–37)
BACTERIA URNS QL MICRO: NEGATIVE /HPF
BARBITURATES UR QL SCN: NEGATIVE
BASE DEFICIT BLDV-SCNC: 4.1 MMOL/L
BDY SITE: ABNORMAL
BENZODIAZ UR QL: POSITIVE
BILIRUB SERPL-MCNC: 0.7 MG/DL (ref 0.2–1)
BILIRUB UR QL: NEGATIVE
BLOOD GROUP ANTIBODIES SERPL: NEGATIVE
BUN SERPL-MCNC: 18 MG/DL (ref 6–20)
BUN/CREAT SERPL: 21 (ref 12–20)
CA-I BLD-MCNC: 9.8 MG/DL (ref 8.5–10.1)
CANNABINOIDS UR QL SCN: POSITIVE
CHLORIDE SERPL-SCNC: 105 MMOL/L (ref 97–108)
CK SERPL-CCNC: 1128 U/L (ref 26–192)
CO2 SERPL-SCNC: 20 MMOL/L (ref 21–32)
COCAINE UR QL SCN: NEGATIVE
COLOR UR: ABNORMAL
CREAT SERPL-MCNC: 0.84 MG/DL (ref 0.55–1.02)
DRUG SCRN COMMENT,DRGCM: ABNORMAL
EPITH CASTS URNS QL MICRO: ABNORMAL /LPF
ERYTHROCYTE [DISTWIDTH] IN BLOOD BY AUTOMATED COUNT: 12.9 % (ref 11.5–14.5)
ETHANOL SERPL-MCNC: <10 MG/DL
FIO2 ON VENT: 21 %
GLOBULIN SER CALC-MCNC: 4.4 G/DL (ref 2–4)
GLUCOSE SERPL-MCNC: 118 MG/DL (ref 65–100)
GLUCOSE UR STRIP.AUTO-MCNC: NEGATIVE MG/DL
HCO3 BLDV-SCNC: 19 MMOL/L (ref 24–25)
HCT VFR BLD AUTO: 38.6 % (ref 35–47)
HCT VFR BLD AUTO: 46.2 % (ref 35–47)
HGB BLD-MCNC: 13.1 G/DL (ref 11.5–16)
HGB BLD-MCNC: 15.9 G/DL (ref 11.5–16)
HGB UR QL STRIP: NEGATIVE
INR PPP: 1 (ref 0.9–1.1)
KETONES UR QL STRIP.AUTO: 20 MG/DL
LACTATE SERPL-SCNC: 4 MMOL/L (ref 0.4–2)
LEUKOCYTE ESTERASE UR QL STRIP.AUTO: NEGATIVE
MCH RBC QN AUTO: 29.8 PG (ref 26–34)
MCHC RBC AUTO-ENTMCNC: 34.4 G/DL (ref 30–36.5)
MCV RBC AUTO: 86.7 FL (ref 80–99)
METHADONE UR QL: NEGATIVE
MRSA DNA SPEC QL NAA+PROBE: NOT DETECTED
MUCOUS THREADS URNS QL MICRO: ABNORMAL /LPF
NITRITE UR QL STRIP.AUTO: NEGATIVE
NRBC # BLD: 0 K/UL (ref 0–0.01)
NRBC BLD-RTO: 0 PER 100 WBC
OPIATES UR QL: NEGATIVE
PCO2 BLDV: 28.4 MMHG (ref 41–51)
PCP UR QL: NEGATIVE
PERFORMED BY, TECHID: ABNORMAL
PH BLDV: 7.43 (ref 7.32–7.42)
PH UR STRIP: 7 (ref 5–8)
PLATELET # BLD AUTO: 346 K/UL (ref 150–400)
PMV BLD AUTO: 10.1 FL (ref 8.9–12.9)
PO2 BLDV: 47 MMHG (ref 25–40)
POTASSIUM SERPL-SCNC: 3.1 MMOL/L (ref 3.5–5.1)
PROCALCITONIN SERPL-MCNC: <0.05 NG/ML
PROT SERPL-MCNC: 8.2 G/DL (ref 6.4–8.2)
PROT UR STRIP-MCNC: NEGATIVE MG/DL
PROTHROMBIN TIME: 13.4 SEC (ref 11.9–14.6)
RBC # BLD AUTO: 5.33 M/UL (ref 3.8–5.2)
RBC #/AREA URNS HPF: ABNORMAL /HPF (ref 0–5)
SAO2% DEVICE SAO2% SENSOR NAME: ABNORMAL
SODIUM SERPL-SCNC: 135 MMOL/L (ref 136–145)
SP GR UR REFRACTOMETRY: >1.03 (ref 1–1.03)
SPECIMEN EXP DATE BLD: NORMAL
SPECIMEN SITE: ABNORMAL
THERAPEUTIC RANGE,PTTT: NORMAL SEC (ref 82–109)
TROPONIN I SERPL HS-MCNC: 12 NG/L (ref 0–51)
UROBILINOGEN UR QL STRIP.AUTO: 0.1 EU/DL (ref 0.1–1)
WBC # BLD AUTO: 37.3 K/UL (ref 3.6–11)
WBC URNS QL MICRO: ABNORMAL /HPF (ref 0–4)

## 2023-03-04 PROCEDURE — 96374 THER/PROPH/DIAG INJ IV PUSH: CPT

## 2023-03-04 PROCEDURE — 83690 ASSAY OF LIPASE: CPT

## 2023-03-04 PROCEDURE — 99285 EMERGENCY DEPT VISIT HI MDM: CPT

## 2023-03-04 PROCEDURE — 85018 HEMOGLOBIN: CPT

## 2023-03-04 PROCEDURE — 70450 CT HEAD/BRAIN W/O DYE: CPT

## 2023-03-04 PROCEDURE — 70498 CT ANGIOGRAPHY NECK: CPT

## 2023-03-04 PROCEDURE — 82550 ASSAY OF CK (CPK): CPT

## 2023-03-04 PROCEDURE — 72170 X-RAY EXAM OF PELVIS: CPT

## 2023-03-04 PROCEDURE — 82077 ASSAY SPEC XCP UR&BREATH IA: CPT

## 2023-03-04 PROCEDURE — 82803 BLOOD GASES ANY COMBINATION: CPT

## 2023-03-04 PROCEDURE — 87641 MR-STAPH DNA AMP PROBE: CPT

## 2023-03-04 PROCEDURE — 90714 TD VACC NO PRESV 7 YRS+ IM: CPT | Performed by: EMERGENCY MEDICINE

## 2023-03-04 PROCEDURE — 87040 BLOOD CULTURE FOR BACTERIA: CPT

## 2023-03-04 PROCEDURE — 80053 COMPREHEN METABOLIC PANEL: CPT

## 2023-03-04 PROCEDURE — 84145 PROCALCITONIN (PCT): CPT

## 2023-03-04 PROCEDURE — 72125 CT NECK SPINE W/O DYE: CPT

## 2023-03-04 PROCEDURE — 85027 COMPLETE CBC AUTOMATED: CPT

## 2023-03-04 PROCEDURE — 75810000293 HC SIMP/SUPERF WND  RPR

## 2023-03-04 PROCEDURE — 93005 ELECTROCARDIOGRAM TRACING: CPT

## 2023-03-04 PROCEDURE — 99222 1ST HOSP IP/OBS MODERATE 55: CPT | Performed by: SURGERY

## 2023-03-04 PROCEDURE — 74011000250 HC RX REV CODE- 250: Performed by: EMERGENCY MEDICINE

## 2023-03-04 PROCEDURE — 74011000636 HC RX REV CODE- 636: Performed by: EMERGENCY MEDICINE

## 2023-03-04 PROCEDURE — 73610 X-RAY EXAM OF ANKLE: CPT

## 2023-03-04 PROCEDURE — G0378 HOSPITAL OBSERVATION PER HR: HCPCS

## 2023-03-04 PROCEDURE — 84484 ASSAY OF TROPONIN QUANT: CPT

## 2023-03-04 PROCEDURE — 90471 IMMUNIZATION ADMIN: CPT

## 2023-03-04 PROCEDURE — 81001 URINALYSIS AUTO W/SCOPE: CPT

## 2023-03-04 PROCEDURE — 85730 THROMBOPLASTIN TIME PARTIAL: CPT

## 2023-03-04 PROCEDURE — 71260 CT THORAX DX C+: CPT

## 2023-03-04 PROCEDURE — 96375 TX/PRO/DX INJ NEW DRUG ADDON: CPT

## 2023-03-04 PROCEDURE — 86900 BLOOD TYPING SEROLOGIC ABO: CPT

## 2023-03-04 PROCEDURE — 71045 X-RAY EXAM CHEST 1 VIEW: CPT

## 2023-03-04 PROCEDURE — 85610 PROTHROMBIN TIME: CPT

## 2023-03-04 PROCEDURE — 83605 ASSAY OF LACTIC ACID: CPT

## 2023-03-04 PROCEDURE — 74011250636 HC RX REV CODE- 250/636: Performed by: EMERGENCY MEDICINE

## 2023-03-04 PROCEDURE — 80307 DRUG TEST PRSMV CHEM ANLYZR: CPT

## 2023-03-04 RX ORDER — ONDANSETRON 2 MG/ML
4 INJECTION INTRAMUSCULAR; INTRAVENOUS
Status: DISCONTINUED | OUTPATIENT
Start: 2023-03-04 | End: 2023-03-06 | Stop reason: HOSPADM

## 2023-03-04 RX ORDER — PAROXETINE HYDROCHLORIDE 20 MG/1
40 TABLET, FILM COATED ORAL DAILY
Status: DISCONTINUED | OUTPATIENT
Start: 2023-03-05 | End: 2023-03-06 | Stop reason: HOSPADM

## 2023-03-04 RX ORDER — BACITRACIN ZINC 500 UNIT/G
1 OINTMENT IN PACKET (EA) TOPICAL
Status: COMPLETED | OUTPATIENT
Start: 2023-03-04 | End: 2023-03-05

## 2023-03-04 RX ORDER — SODIUM CHLORIDE 0.9 % (FLUSH) 0.9 %
5-40 SYRINGE (ML) INJECTION EVERY 8 HOURS
Status: DISCONTINUED | OUTPATIENT
Start: 2023-03-04 | End: 2023-03-06 | Stop reason: HOSPADM

## 2023-03-04 RX ORDER — BUSPIRONE HYDROCHLORIDE 10 MG/1
5 TABLET ORAL 2 TIMES DAILY
Status: DISCONTINUED | OUTPATIENT
Start: 2023-03-04 | End: 2023-03-06 | Stop reason: HOSPADM

## 2023-03-04 RX ORDER — ONDANSETRON 4 MG/1
4 TABLET, ORALLY DISINTEGRATING ORAL
Status: DISCONTINUED | OUTPATIENT
Start: 2023-03-04 | End: 2023-03-06 | Stop reason: HOSPADM

## 2023-03-04 RX ORDER — ACETAMINOPHEN 650 MG/1
650 SUPPOSITORY RECTAL
Status: DISCONTINUED | OUTPATIENT
Start: 2023-03-04 | End: 2023-03-06 | Stop reason: HOSPADM

## 2023-03-04 RX ORDER — ONDANSETRON 2 MG/ML
4 INJECTION INTRAMUSCULAR; INTRAVENOUS
Status: COMPLETED | OUTPATIENT
Start: 2023-03-04 | End: 2023-03-04

## 2023-03-04 RX ORDER — POTASSIUM CHLORIDE 750 MG/1
40 TABLET, FILM COATED, EXTENDED RELEASE ORAL ONCE
Status: COMPLETED | OUTPATIENT
Start: 2023-03-05 | End: 2023-03-05

## 2023-03-04 RX ORDER — LISINOPRIL 20 MG/1
20 TABLET ORAL DAILY
Status: DISCONTINUED | OUTPATIENT
Start: 2023-03-05 | End: 2023-03-06 | Stop reason: HOSPADM

## 2023-03-04 RX ORDER — SODIUM CHLORIDE 9 MG/ML
75 INJECTION, SOLUTION INTRAVENOUS CONTINUOUS
Status: DISCONTINUED | OUTPATIENT
Start: 2023-03-04 | End: 2023-03-06 | Stop reason: HOSPADM

## 2023-03-04 RX ORDER — ALPRAZOLAM 0.5 MG/1
0.5 TABLET ORAL
Status: DISCONTINUED | OUTPATIENT
Start: 2023-03-04 | End: 2023-03-06 | Stop reason: HOSPADM

## 2023-03-04 RX ORDER — SODIUM CHLORIDE 0.9 % (FLUSH) 0.9 %
5-40 SYRINGE (ML) INJECTION AS NEEDED
Status: DISCONTINUED | OUTPATIENT
Start: 2023-03-04 | End: 2023-03-06 | Stop reason: HOSPADM

## 2023-03-04 RX ORDER — POLYETHYLENE GLYCOL 3350 17 G/17G
17 POWDER, FOR SOLUTION ORAL DAILY PRN
Status: DISCONTINUED | OUTPATIENT
Start: 2023-03-04 | End: 2023-03-06 | Stop reason: HOSPADM

## 2023-03-04 RX ORDER — AMLODIPINE BESYLATE 5 MG/1
2.5 TABLET ORAL DAILY
Status: DISCONTINUED | OUTPATIENT
Start: 2023-03-05 | End: 2023-03-06 | Stop reason: HOSPADM

## 2023-03-04 RX ORDER — ACETAMINOPHEN 325 MG/1
650 TABLET ORAL
Status: DISCONTINUED | OUTPATIENT
Start: 2023-03-04 | End: 2023-03-06 | Stop reason: HOSPADM

## 2023-03-04 RX ADMIN — SODIUM CHLORIDE 1710 ML: 9 INJECTION, SOLUTION INTRAVENOUS at 17:25

## 2023-03-04 RX ADMIN — ONDANSETRON 4 MG: 2 INJECTION INTRAMUSCULAR; INTRAVENOUS at 16:33

## 2023-03-04 RX ADMIN — IOPAMIDOL 100 ML: 755 INJECTION, SOLUTION INTRAVENOUS at 16:05

## 2023-03-04 RX ADMIN — CLOSTRIDIUM TETANI TOXOID ANTIGEN (FORMALDEHYDE INACTIVATED) AND CORYNEBACTERIUM DIPHTHERIAE TOXOID ANTIGEN (FORMALDEHYDE INACTIVATED) 0.5 ML: 5; 2 INJECTION, SUSPENSION INTRAMUSCULAR at 16:40

## 2023-03-04 RX ADMIN — CEFTRIAXONE SODIUM 1 G: 1 INJECTION, POWDER, FOR SOLUTION INTRAMUSCULAR; INTRAVENOUS at 17:26
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
## 2023-03-05 LAB
HCT VFR BLD AUTO: 35.3 % (ref 35–47)
HCT VFR BLD AUTO: 39.5 % (ref 35–47)
HGB BLD-MCNC: 11.9 G/DL (ref 11.5–16)
HGB BLD-MCNC: 13.4 G/DL (ref 11.5–16)

## 2023-03-05 PROCEDURE — 85014 HEMATOCRIT: CPT

## 2023-03-05 PROCEDURE — 36415 COLL VENOUS BLD VENIPUNCTURE: CPT

## 2023-03-05 PROCEDURE — G0378 HOSPITAL OBSERVATION PER HR: HCPCS

## 2023-03-05 PROCEDURE — 99232 SBSQ HOSP IP/OBS MODERATE 35: CPT | Performed by: SURGERY

## 2023-03-05 PROCEDURE — 74011250637 HC RX REV CODE- 250/637: Performed by: SURGERY

## 2023-03-05 PROCEDURE — 85018 HEMOGLOBIN: CPT

## 2023-03-05 PROCEDURE — 74011000250 HC RX REV CODE- 250: Performed by: SURGERY

## 2023-03-05 PROCEDURE — 74011250636 HC RX REV CODE- 250/636: Performed by: SURGERY

## 2023-03-05 PROCEDURE — 74011000250 HC RX REV CODE- 250

## 2023-03-05 RX ORDER — IBUPROFEN 200 MG
200 TABLET ORAL AS NEEDED
COMMUNITY

## 2023-03-05 RX ADMIN — SODIUM CHLORIDE, PRESERVATIVE FREE 10 ML: 5 INJECTION INTRAVENOUS at 22:23

## 2023-03-05 RX ADMIN — PAROXETINE HYDROCHLORIDE 40 MG: 20 TABLET, FILM COATED ORAL at 09:27

## 2023-03-05 RX ADMIN — BUSPIRONE HYDROCHLORIDE 5 MG: 10 TABLET ORAL at 22:23

## 2023-03-05 RX ADMIN — ACETAMINOPHEN 650 MG: 325 TABLET ORAL at 11:13

## 2023-03-05 RX ADMIN — AMLODIPINE BESYLATE 2.5 MG: 5 TABLET ORAL at 09:27

## 2023-03-05 RX ADMIN — LISINOPRIL 20 MG: 20 TABLET ORAL at 09:27

## 2023-03-05 RX ADMIN — SODIUM CHLORIDE, PRESERVATIVE FREE 10 ML: 5 INJECTION INTRAVENOUS at 06:44

## 2023-03-05 RX ADMIN — BUSPIRONE HYDROCHLORIDE 5 MG: 10 TABLET ORAL at 09:27

## 2023-03-05 RX ADMIN — SODIUM CHLORIDE, PRESERVATIVE FREE 10 ML: 5 INJECTION INTRAVENOUS at 00:37

## 2023-03-05 RX ADMIN — POTASSIUM CHLORIDE 40 MEQ: 750 TABLET, FILM COATED, EXTENDED RELEASE ORAL at 00:37

## 2023-03-05 RX ADMIN — SODIUM CHLORIDE 75 ML/HR: 9 INJECTION, SOLUTION INTRAVENOUS at 00:37

## 2023-03-05 RX ADMIN — ACETAMINOPHEN 650 MG: 325 TABLET ORAL at 16:59

## 2023-03-05 RX ADMIN — Medication 1 PACKET: at 06:44

## 2023-03-05 RX ADMIN — BUSPIRONE HYDROCHLORIDE 5 MG: 10 TABLET ORAL at 00:37

## 2023-03-05 NOTE — PROGRESS NOTES
Medicare Outpatient Observation Notice (MOON)/ [place] Outpatient Observation Notice (Ann [place]) provided to patient/representative with verbal explanation of the notice. Time allotted for questions regarding the notice. Patient /representative provided a completed copy of the MOON/VOON notice. Copy filed in Med Rec Dept.
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
## 2023-03-05 NOTE — PROGRESS NOTES
Patient requesting something to eat and drink. NPO order placed in ED. MD Baxter contacted for clarification. Per MD patient okay to have something to eat and drink but nothing after midnight for possible procedure. K+ low at 3.1, verbal received for 40 meq one time dose of potassium to be administered.

## 2023-03-05 NOTE — PROGRESS NOTES
Problem: Pain  Goal: *Control of Pain  Outcome: Progressing Towards Goal     Problem: Falls - Risk of  Goal: *Absence of Falls  Description: Document Janis Fall Risk and appropriate interventions in the flowsheet.   Outcome: Progressing Towards Goal  Note: Fall Risk Interventions:                                Problem: Patient Education: Go to Patient Education Activity  Goal: Patient/Family Education  Outcome: Progressing Towards Goal

## 2023-03-06 VITALS
HEART RATE: 79 BPM | SYSTOLIC BLOOD PRESSURE: 147 MMHG | DIASTOLIC BLOOD PRESSURE: 68 MMHG | WEIGHT: 219.34 LBS | HEIGHT: 64 IN | RESPIRATION RATE: 18 BRPM | OXYGEN SATURATION: 97 % | TEMPERATURE: 98.2 F | BODY MASS INDEX: 37.45 KG/M2

## 2023-03-06 PROCEDURE — 74011000250 HC RX REV CODE- 250: Performed by: SURGERY

## 2023-03-06 PROCEDURE — 97161 PT EVAL LOW COMPLEX 20 MIN: CPT

## 2023-03-06 PROCEDURE — 97530 THERAPEUTIC ACTIVITIES: CPT

## 2023-03-06 PROCEDURE — G0378 HOSPITAL OBSERVATION PER HR: HCPCS

## 2023-03-06 PROCEDURE — 74011250637 HC RX REV CODE- 250/637: Performed by: SURGERY

## 2023-03-06 PROCEDURE — 99238 HOSP IP/OBS DSCHRG MGMT 30/<: CPT | Performed by: SURGERY

## 2023-03-06 RX ORDER — OXYCODONE AND ACETAMINOPHEN 5; 325 MG/1; MG/1
1 TABLET ORAL
Qty: 21 TABLET | Refills: 0 | Status: SHIPPED | OUTPATIENT
Start: 2023-03-06 | End: 2023-03-13

## 2023-03-06 RX ADMIN — ACETAMINOPHEN 650 MG: 325 TABLET ORAL at 01:49

## 2023-03-06 RX ADMIN — PAROXETINE HYDROCHLORIDE 40 MG: 20 TABLET, FILM COATED ORAL at 09:08

## 2023-03-06 RX ADMIN — SODIUM CHLORIDE, PRESERVATIVE FREE 10 ML: 5 INJECTION INTRAVENOUS at 06:11

## 2023-03-06 RX ADMIN — BUSPIRONE HYDROCHLORIDE 5 MG: 10 TABLET ORAL at 09:09

## 2023-03-06 RX ADMIN — ACETAMINOPHEN 650 MG: 325 TABLET ORAL at 09:08

## 2023-03-06 RX ADMIN — LISINOPRIL 20 MG: 20 TABLET ORAL at 09:09

## 2023-03-06 RX ADMIN — AMLODIPINE BESYLATE 2.5 MG: 5 TABLET ORAL at 09:10

## 2023-03-06 NOTE — PROGRESS NOTES
Discharge plan of care/case management plan validated with provider discharge order. Discharge instructions given w pt verbalizing understanding. PIV x1 removed with cath tip intact.
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
## 2023-03-06 NOTE — PROGRESS NOTES
Problem: Pain  Goal: *Control of Pain  Outcome: Resolved/Met     Problem: Falls - Risk of  Goal: *Absence of Falls  Description: Document Janis Fall Risk and appropriate interventions in the flowsheet.   Outcome: Resolved/Met  Note: Fall Risk Interventions:                                Problem: Patient Education: Go to Patient Education Activity  Goal: Patient/Family Education  Outcome: Resolved/Met
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
## 2023-03-06 NOTE — PROGRESS NOTES
Patient discharging home today, self care from observation stay. Discharge plan of care/case management plan validated with provider discharge order. Patient needs rolling walker for safe discharge. Was given permission to send referral to [facility]. Family thinks they may have one but explained that [facility] closes at 3:30pm so it would not hurt to try while waiting on family to call us back.

## 2023-03-06 NOTE — PROGRESS NOTES
Problem: Pain  Goal: *Control of Pain  Outcome: Progressing Towards Goal     Problem: Falls - Risk of  Goal: *Absence of Falls  Description: Document Janis Fall Risk and appropriate interventions in the flowsheet.   Outcome: Progressing Towards Goal  Note: Fall Risk Interventions: bed in lowest position       Problem: Patient Education: Go to Patient Education Activity  Goal: Patient/Family Education  Outcome: Progressing Towards Goal     Problem: Patient Education: Go to Patient Education Activity  Goal: Patient/Family Education  Outcome: Progressing Towards Goal

## 2023-03-07 ENCOUNTER — TELEPHONE (OUTPATIENT)
Dept: PRIMARY CARE CLINIC | Age: 73
End: 2023-03-07

## 2023-03-07 LAB
ATRIAL RATE: 106 BPM
CALCULATED P AXIS, ECG09: 68 DEGREES
CALCULATED R AXIS, ECG10: 69 DEGREES
CALCULATED T AXIS, ECG11: 47 DEGREES
DIAGNOSIS, 93000: NORMAL
P-R INTERVAL, ECG05: 162 MS
Q-T INTERVAL, ECG07: 352 MS
QRS DURATION, ECG06: 102 MS
QTC CALCULATION (BEZET), ECG08: 467 MS
VENTRICULAR RATE, ECG03: 106 BPM

## 2023-03-07 NOTE — TELEPHONE ENCOUNTER
Care Transitions Initial Follow Up Call    Outreach made within 2 business days of discharge: Yes    Patient: Mallory Pack Patient : 1950   MRN: [id number]  Reason for Admission: 2023  Discharge Date: 3/6/23       Spoke with: Patient    Discharge department/facility: [facility] Interactive Patient Contact:  Was patient able to fill all prescriptions: Yes  Was patient instructed to bring all medications to the follow-up visit: Yes  Is patient taking all medications as directed in the discharge summary?  Yes  Does patient understand their discharge instructions: Yes  Does patient have questions or concerns that need addressed prior to 7-14 day follow up office visit: No    Scheduled appointment with PCP within 7-14 days    Follow Up  Future Appointments   Date Time Provider Sharon Rico   3/20/2023 10:30 AM Maryse Baxter MD [facility] BS AMB       Dean Han

## 2023-03-08 LAB — LIPASE SERPL-CCNC: 82 U/L (ref 73–393)

## 2023-03-10 LAB
BACTERIA SPEC CULT: NORMAL
SPECIAL REQUESTS,SREQ: NORMAL

## 2023-03-20 ENCOUNTER — OFFICE VISIT (OUTPATIENT)
Dept: SURGERY | Age: 73
End: 2023-03-20
Payer: MEDICARE

## 2023-03-20 VITALS
SYSTOLIC BLOOD PRESSURE: 131 MMHG | TEMPERATURE: 97.9 F | BODY MASS INDEX: 36.88 KG/M2 | HEART RATE: 77 BPM | HEIGHT: 64 IN | OXYGEN SATURATION: 96 % | WEIGHT: 216 LBS | DIASTOLIC BLOOD PRESSURE: 79 MMHG
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
## 2023-03-20 DIAGNOSIS — E66.01 SEVERE OBESITY (BMI 35.0-39.9) WITH COMORBIDITY (HCC): ICD-10-CM

## 2023-03-20 DIAGNOSIS — R58 HEMORRHAGE: Primary | ICD-10-CM

## 2023-03-20 PROCEDURE — 3074F SYST BP LT 130 MM HG: CPT | Performed by: SURGERY

## 2023-03-20 PROCEDURE — 99213 OFFICE O/P EST LOW 20 MIN: CPT | Performed by: SURGERY

## 2023-03-20 PROCEDURE — G9717 DOC PT DX DEP/BP F/U NT REQ: HCPCS | Performed by: SURGERY

## 2023-03-20 PROCEDURE — G8536 NO DOC ELDER MAL SCRN: HCPCS | Performed by: SURGERY

## 2023-03-20 PROCEDURE — G8400 PT W/DXA NO RESULTS DOC: HCPCS | Performed by: SURGERY

## 2023-03-20 PROCEDURE — G8417 CALC BMI ABV UP PARAM F/U: HCPCS | Performed by: SURGERY

## 2023-03-20 PROCEDURE — 3078F DIAST BP <80 MM HG: CPT | Performed by: SURGERY

## 2023-03-20 PROCEDURE — 3017F COLORECTAL CA SCREEN DOC REV: CPT | Performed by: SURGERY

## 2023-03-20 PROCEDURE — 1101F PT FALLS ASSESS-DOCD LE1/YR: CPT | Performed by: SURGERY

## 2023-03-20 PROCEDURE — 1090F PRES/ABSN URINE INCON ASSESS: CPT | Performed by: SURGERY

## 2023-03-20 PROCEDURE — G8427 DOCREV CUR MEDS BY ELIG CLIN: HCPCS | Performed by: SURGERY

## 2023-03-20 PROCEDURE — 1123F ACP DISCUSS/DSCN MKR DOCD: CPT | Performed by: SURGERY

## 2023-03-20 RX ORDER — CYCLOBENZAPRINE HCL 10 MG
5 TABLET ORAL
Qty: 30 TABLET | Refills: 0 | Status: SHIPPED | OUTPATIENT
Start: 2023-03-20

## 2023-03-20 RX ORDER — CYCLOBENZAPRINE HCL 5 MG
5 TABLET ORAL
Qty: 30 TABLET | Refills: 0 | Status: SHIPPED | OUTPATIENT
Start: 2023-03-20
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
## 2023-04-03 ENCOUNTER — OFFICE VISIT (OUTPATIENT)
Dept: SURGERY | Age: 73
End: 2023-04-03
Payer: MEDICARE

## 2023-04-03 PROCEDURE — G8536 NO DOC ELDER MAL SCRN: HCPCS | Performed by: SURGERY

## 2023-04-03 PROCEDURE — G8417 CALC BMI ABV UP PARAM F/U: HCPCS | Performed by: SURGERY

## 2023-04-03 PROCEDURE — 1123F ACP DISCUSS/DSCN MKR DOCD: CPT | Performed by: SURGERY

## 2023-04-03 PROCEDURE — 99213 OFFICE O/P EST LOW 20 MIN: CPT | Performed by: SURGERY

## 2023-04-03 PROCEDURE — 3074F SYST BP LT 130 MM HG: CPT | Performed by: SURGERY

## 2023-04-03 PROCEDURE — G8400 PT W/DXA NO RESULTS DOC: HCPCS | Performed by: SURGERY

## 2023-04-03 PROCEDURE — G8427 DOCREV CUR MEDS BY ELIG CLIN: HCPCS | Performed by: SURGERY

## 2023-04-03 PROCEDURE — 3017F COLORECTAL CA SCREEN DOC REV: CPT | Performed by: SURGERY

## 2023-04-03 PROCEDURE — 3078F DIAST BP <80 MM HG: CPT | Performed by: SURGERY

## 2023-04-03 PROCEDURE — G9717 DOC PT DX DEP/BP F/U NT REQ: HCPCS | Performed by: SURGERY

## 2023-04-03 PROCEDURE — 1101F PT FALLS ASSESS-DOCD LE1/YR: CPT | Performed by: SURGERY

## 2023-04-03 PROCEDURE — 1090F PRES/ABSN URINE INCON ASSESS: CPT | Performed by: SURGERY

## 2023-04-03 RX ORDER — CYCLOBENZAPRINE HCL 10 MG
10 TABLET ORAL
Qty: 30 TABLET | Refills: 3 | Status: SHIPPED | OUTPATIENT
Start: 2023-04-03 | End: 2023-05-13

## 2023-04-03 RX ORDER — CYCLOBENZAPRINE HCL 10 MG
10 TABLET ORAL
Qty: 30 TABLET | Refills: 3 | Status: SHIPPED | OUTPATIENT
Start: 2023-04-03 | End: 2023-04-13

## 2023-04-03 NOTE — PROGRESS NOTES
Identified pt with two pt identifiers (name and ). Reviewed chart in preparation for visit and have obtained necessary documentation. Milad Kemp is a 81 y.o. female  Chief Complaint   Patient presents with    Follow-up     hemorrhage-2w follow up     Visit Vitals  /77 (BP 1 Location: Left upper arm, BP Patient Position: Sitting, BP Cuff Size: Large adult)   Pulse 66   Temp 97.5 °F (36.4 °C) (Temporal)   Resp 18   Ht 5' 4\" (1.626 m)   Wt 219 lb 8 oz (99.6 kg)   SpO2 98%   BMI 37.68 kg/m²       1. Have you been to the ER, urgent care clinic since your last visit? Hospitalized since your last visit? No    2. Have you seen or consulted any other health care providers outside of the [address] [place] [address] since your last visit? Include any pap smears or colon screening.  No
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
## 2023-06-21 RX ORDER — AMLODIPINE BESYLATE 2.5 MG/1
TABLET ORAL
Qty: 90 TABLET | Refills: 0 | OUTPATIENT
Start: 2023-06-21

## 2023-06-21 RX ORDER — ALPRAZOLAM 0.5 MG/1
TABLET ORAL
Qty: 30 TABLET | Refills: 0 | OUTPATIENT
Start: 2023-06-21

## 2023-07-13 ENCOUNTER — TELEMEDICINE (OUTPATIENT)
Dept: PRIMARY CARE CLINIC | Facility: CLINIC | Age: 73
End: 2023-07-13
Payer: MEDICARE

## 2023-07-13 DIAGNOSIS — F41.1 GENERALIZED ANXIETY DISORDER: Primary | ICD-10-CM

## 2023-07-13 DIAGNOSIS — I10 ESSENTIAL (PRIMARY) HYPERTENSION: Chronic | ICD-10-CM

## 2023-07-13 PROCEDURE — 1036F TOBACCO NON-USER: CPT | Performed by: NURSE PRACTITIONER

## 2023-07-13 PROCEDURE — 99214 OFFICE O/P EST MOD 30 MIN: CPT | Performed by: NURSE PRACTITIONER

## 2023-07-13 PROCEDURE — G8400 PT W/DXA NO RESULTS DOC: HCPCS | Performed by: NURSE PRACTITIONER

## 2023-07-13 PROCEDURE — G8427 DOCREV CUR MEDS BY ELIG CLIN: HCPCS | Performed by: NURSE PRACTITIONER

## 2023-07-13 PROCEDURE — 1090F PRES/ABSN URINE INCON ASSESS: CPT | Performed by: NURSE PRACTITIONER

## 2023-07-13 PROCEDURE — G8419 CALC BMI OUT NRM PARAM NOF/U: HCPCS | Performed by: NURSE PRACTITIONER

## 2023-07-13 PROCEDURE — 3017F COLORECTAL CA SCREEN DOC REV: CPT | Performed by: NURSE PRACTITIONER

## 2023-07-13 PROCEDURE — 1123F ACP DISCUSS/DSCN MKR DOCD: CPT | Performed by: NURSE PRACTITIONER

## 2023-07-13 RX ORDER — ALPRAZOLAM 0.5 MG/1
TABLET ORAL
Qty: 30 TABLET | Refills: 1 | Status: SHIPPED | OUTPATIENT
Start: 2023-07-13 | End: 2024-01-13

## 2023-07-13 RX ORDER — PAROXETINE HYDROCHLORIDE 20 MG/1
40 TABLET, FILM COATED ORAL DAILY
Qty: 180 TABLET | Refills: 1 | Status: SHIPPED | OUTPATIENT
Start: 2023-07-13

## 2023-07-13 RX ORDER — LISINOPRIL 20 MG/1
20 TABLET ORAL DAILY
Qty: 90 TABLET | Refills: 1 | Status: SHIPPED | OUTPATIENT
Start: 2023-07-13

## 2023-07-13 RX ORDER — BUSPIRONE HYDROCHLORIDE 5 MG/1
5 TABLET ORAL 2 TIMES DAILY
Qty: 180 TABLET | Refills: 1 | Status: SHIPPED | OUTPATIENT
Start: 2023-07-13

## 2023-07-13 RX ORDER — AMLODIPINE BESYLATE 2.5 MG/1
2.5 TABLET ORAL DAILY
Qty: 90 TABLET | Refills: 1 | Status: SHIPPED | OUTPATIENT
Start: 2023-07-13

## 2023-07-13 ASSESSMENT — PATIENT HEALTH QUESTIONNAIRE - PHQ9
1. LITTLE INTEREST OR PLEASURE IN DOING THINGS: 0
SUM OF ALL RESPONSES TO PHQ QUESTIONS 1-9: 0
2. FEELING DOWN, DEPRESSED OR HOPELESS: 0
SUM OF ALL RESPONSES TO PHQ QUESTIONS 1-9: 0
SUM OF ALL RESPONSES TO PHQ QUESTIONS 1-9: 0
SUM OF ALL RESPONSES TO PHQ9 QUESTIONS 1 & 2: 0
SUM OF ALL RESPONSES TO PHQ QUESTIONS 1-9: 0

## 2023-07-13 ASSESSMENT — ENCOUNTER SYMPTOMS
SHORTNESS OF BREATH: 0
CHEST TIGHTNESS: 0
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
## 2024-04-17 ENCOUNTER — OFFICE VISIT (OUTPATIENT)
Dept: PRIMARY CARE CLINIC | Facility: CLINIC | Age: 74
End: 2024-04-17
Payer: MEDICARE

## 2024-04-17 VITALS
HEIGHT: 64 IN | WEIGHT: 244 LBS | TEMPERATURE: 98.2 F | HEART RATE: 100 BPM | DIASTOLIC BLOOD PRESSURE: 88 MMHG | SYSTOLIC BLOOD PRESSURE: 136 MMHG | OXYGEN SATURATION: 98 % | BODY MASS INDEX: 41.66 KG/M2 | RESPIRATION RATE: 16 BRPM

## 2024-04-17 DIAGNOSIS — R53.83 OTHER FATIGUE: ICD-10-CM

## 2024-04-17 DIAGNOSIS — R73.09 ELEVATED GLUCOSE: ICD-10-CM

## 2024-04-17 DIAGNOSIS — Z01.818 PRE-OP EXAM: ICD-10-CM

## 2024-04-17 DIAGNOSIS — I10 ESSENTIAL (PRIMARY) HYPERTENSION: Chronic | ICD-10-CM

## 2024-04-17 DIAGNOSIS — H25.89 OTHER AGE-RELATED CATARACT OF BOTH EYES: ICD-10-CM

## 2024-04-17 DIAGNOSIS — E66.01 OBESITY, CLASS III, BMI 40-49.9 (MORBID OBESITY) (HCC): ICD-10-CM

## 2024-04-17 DIAGNOSIS — Z53.20 COLON CANCER SCREENING DECLINED: ICD-10-CM

## 2024-04-17 DIAGNOSIS — R79.89 HIGH SERUM THYROID STIMULATING HORMONE (TSH): ICD-10-CM

## 2024-04-17 DIAGNOSIS — Z00.00 MEDICARE ANNUAL WELLNESS VISIT, SUBSEQUENT: Primary | ICD-10-CM

## 2024-04-17 DIAGNOSIS — Z53.20 MAMMOGRAM DECLINED: ICD-10-CM

## 2024-04-17 DIAGNOSIS — F41.1 GENERALIZED ANXIETY DISORDER: Chronic | ICD-10-CM

## 2024-04-17 DIAGNOSIS — Z23 IMMUNIZATION DUE: ICD-10-CM

## 2024-04-17 DIAGNOSIS — E55.9 VITAMIN D DEFICIENCY: ICD-10-CM

## 2024-04-17 PROCEDURE — 3075F SYST BP GE 130 - 139MM HG: CPT | Performed by: NURSE PRACTITIONER

## 2024-04-17 PROCEDURE — 3017F COLORECTAL CA SCREEN DOC REV: CPT | Performed by: NURSE PRACTITIONER

## 2024-04-17 PROCEDURE — 3079F DIAST BP 80-89 MM HG: CPT | Performed by: NURSE PRACTITIONER

## 2024-04-17 PROCEDURE — G8427 DOCREV CUR MEDS BY ELIG CLIN: HCPCS | Performed by: NURSE PRACTITIONER

## 2024-04-17 PROCEDURE — 1090F PRES/ABSN URINE INCON ASSESS: CPT | Performed by: NURSE PRACTITIONER

## 2024-04-17 PROCEDURE — G0439 PPPS, SUBSEQ VISIT: HCPCS | Performed by: NURSE PRACTITIONER

## 2024-04-17 PROCEDURE — G8417 CALC BMI ABV UP PARAM F/U: HCPCS | Performed by: NURSE PRACTITIONER

## 2024-04-17 PROCEDURE — 1123F ACP DISCUSS/DSCN MKR DOCD: CPT | Performed by: NURSE PRACTITIONER

## 2024-04-17 PROCEDURE — G8400 PT W/DXA NO RESULTS DOC: HCPCS | Performed by: NURSE PRACTITIONER

## 2024-04-17 PROCEDURE — 99214 OFFICE O/P EST MOD 30 MIN: CPT | Performed by: NURSE PRACTITIONER

## 2024-04-17 PROCEDURE — 1036F TOBACCO NON-USER: CPT | Performed by: NURSE PRACTITIONER

## 2024-04-17 RX ORDER — BUSPIRONE HYDROCHLORIDE 10 MG/1
10 TABLET ORAL 2 TIMES DAILY
Qty: 180 TABLET | Refills: 1 | Status: SHIPPED | OUTPATIENT
Start: 2024-04-17

## 2024-04-17 RX ORDER — AMLODIPINE BESYLATE 2.5 MG/1
2.5 TABLET ORAL DAILY
Qty: 90 TABLET | Refills: 1 | Status: SHIPPED | OUTPATIENT
Start: 2024-04-17

## 2024-04-17 RX ORDER — PAROXETINE HYDROCHLORIDE 20 MG/1
40 TABLET, FILM COATED ORAL DAILY
Qty: 180 TABLET | Refills: 1 | Status: SHIPPED | OUTPATIENT
Start: 2024-04-17

## 2024-04-17 RX ORDER — LISINOPRIL 20 MG/1
20 TABLET ORAL DAILY
Qty: 90 TABLET | Refills: 1 | Status: SHIPPED | OUTPATIENT
Start: 2024-04-17

## 2024-04-17 ASSESSMENT — PATIENT HEALTH QUESTIONNAIRE - PHQ9
SUM OF ALL RESPONSES TO PHQ9 QUESTIONS 1 & 2: 0
9. THOUGHTS THAT YOU WOULD BE BETTER OFF DEAD, OR OF HURTING YOURSELF: NOT AT ALL
SUM OF ALL RESPONSES TO PHQ QUESTIONS 1-9: 0
4. FEELING TIRED OR HAVING LITTLE ENERGY: NOT AT ALL
2. FEELING DOWN, DEPRESSED OR HOPELESS: NOT AT ALL
1. LITTLE INTEREST OR PLEASURE IN DOING THINGS: NOT AT ALL
3. TROUBLE FALLING OR STAYING ASLEEP: NOT AT ALL
SUM OF ALL RESPONSES TO PHQ QUESTIONS 1-9: 0
5. POOR APPETITE OR OVEREATING: NOT AT ALL
6. FEELING BAD ABOUT YOURSELF - OR THAT YOU ARE A FAILURE OR HAVE LET YOURSELF OR YOUR FAMILY DOWN: NOT AT ALL
SUM OF ALL RESPONSES TO PHQ QUESTIONS 1-9: 0
SUM OF ALL RESPONSES TO PHQ QUESTIONS 1-9: 0
7. TROUBLE CONCENTRATING ON THINGS, SUCH AS READING THE NEWSPAPER OR WATCHING TELEVISION: NOT AT ALL
10. IF YOU CHECKED OFF ANY PROBLEMS, HOW DIFFICULT HAVE THESE PROBLEMS MADE IT FOR YOU TO DO YOUR WORK, TAKE CARE OF THINGS AT HOME, OR GET ALONG WITH OTHER PEOPLE: NOT DIFFICULT AT ALL
8. MOVING OR SPEAKING SO SLOWLY THAT OTHER PEOPLE COULD HAVE NOTICED. OR THE OPPOSITE, BEING SO FIGETY OR RESTLESS THAT YOU HAVE BEEN MOVING AROUND A LOT MORE THAN USUAL: NOT AT ALL

## 2024-04-17 ASSESSMENT — LIFESTYLE VARIABLES: HOW OFTEN DO YOU HAVE A DRINK CONTAINING ALCOHOL: NEVER

## 2024-04-17 NOTE — PATIENT INSTRUCTIONS

## 2024-04-17 NOTE — PROGRESS NOTES
Chief Complaint   Patient presents with    Medicare AWV    Pre-op Exam     /88 (Site: Right Upper Arm, Position: Sitting)   Pulse (!) 118   Temp 98.2 °F (36.8 °C) (Oral)   Resp 16   Ht 1.626 m (5' 4\")   Wt 110.7 kg (244 lb)   SpO2 98%   BMI 41.88 kg/m²     \"Have you been to the ER, urgent care clinic since your last visit?  Hospitalized since your last visit?\"    NO    “Have you seen or consulted any other health care providers outside of [facility] since your last visit?”    NO    Have you had a mammogram?”   NO    No breast cancer screening on file         “Have you had a colorectal cancer screening such as a colonoscopy/FIT/Cologuard?    NO    No colonoscopy on file  No cologuard on file  No FIT/FOBT on file   No flexible sigmoidoscopy on file         Click Here for Release of Records Request    
Behavior: Behavior normal.            No Active Allergies    Prior to Visit Medications    Medication Sig Taking? Authorizing Provider   busPIRone (BUSPAR) 10 MG tablet Take 1 tablet by mouth 2 times daily Yes Espinoza Vo APRN - NP   lisinopril (PRINIVIL;ZESTRIL) 20 MG tablet Take 1 tablet by mouth daily Yes Espinoza Vo APRN - NP   amLODIPine (NORVASC) 2.5 MG tablet Take 1 tablet by mouth daily Yes Espinoza Vo APRN - NP   PARoxetine (PAXIL) 20 MG tablet Take 2 tablets by mouth daily Yes Espinoza Vo APRN - NP   ALPRAZolam (XANAX) 0.5 MG tablet TAKE 1 TABLET BY MOUTH ONCE DAILY AS NEEDED FOR ANXIETY Yes Espinoza Vo APRN - NP   ibuprofen (ADVIL;MOTRIN) 200 MG tablet Take 1 tablet by mouth as needed Yes Automatic Reconciliation, Ar       CareTeam (Including outside providers/suppliers regularly involved in providing care):   Patient Care Team:  Espinoza Vo APRN - NP as PCP - General  Espinoza Vo APRN - NP as PCP - Empaneled Provider     Reviewed and updated this visit:  Tobacco  Allergies  Meds  Problems  Med Hx  Surg Hx  Soc Hx  Fam Hx

## 2024-04-18 LAB
25(OH)D3+25(OH)D2 SERPL-MCNC: 14.7 NG/ML (ref 30–100)
ALBUMIN SERPL-MCNC: 4.2 G/DL (ref 3.8–4.8)
ALBUMIN/GLOB SERPL: 1.5 {RATIO} (ref 1.2–2.2)
ALP SERPL-CCNC: 108 IU/L (ref 44–121)
ALT SERPL-CCNC: 19 IU/L (ref 0–32)
AST SERPL-CCNC: 21 IU/L (ref 0–40)
BILIRUB SERPL-MCNC: 0.4 MG/DL (ref 0–1.2)
BUN SERPL-MCNC: 11 MG/DL (ref 8–27)
BUN/CREAT SERPL: 16 (ref 12–28)
CALCIUM SERPL-MCNC: 9.6 MG/DL (ref 8.7–10.3)
CHLORIDE SERPL-SCNC: 102 MMOL/L (ref 96–106)
CHOLEST SERPL-MCNC: 155 MG/DL (ref 100–199)
CO2 SERPL-SCNC: 23 MMOL/L (ref 20–29)
CREAT SERPL-MCNC: 0.69 MG/DL (ref 0.57–1)
EGFRCR SERPLBLD CKD-EPI 2021: 93 ML/MIN/1.73
ERYTHROCYTE [DISTWIDTH] IN BLOOD BY AUTOMATED COUNT: 13.1 % (ref 11.7–15.4)
GLOBULIN SER CALC-MCNC: 2.8 G/DL (ref 1.5–4.5)
GLUCOSE SERPL-MCNC: 85 MG/DL (ref 70–99)
HBA1C MFR BLD: 5.1 % (ref 4.8–5.6)
HCT VFR BLD AUTO: 43.4 % (ref 34–46.6)
HDLC SERPL-MCNC: 67 MG/DL
HGB BLD-MCNC: 14.3 G/DL (ref 11.1–15.9)
LDLC SERPL CALC-MCNC: 69 MG/DL (ref 0–99)
MCH RBC QN AUTO: 29.5 PG (ref 26.6–33)
MCHC RBC AUTO-ENTMCNC: 32.9 G/DL (ref 31.5–35.7)
MCV RBC AUTO: 90 FL (ref 79–97)
PLATELET # BLD AUTO: 301 X10E3/UL (ref 150–450)
POTASSIUM SERPL-SCNC: 3.9 MMOL/L (ref 3.5–5.2)
PROT SERPL-MCNC: 7 G/DL (ref 6–8.5)
RBC # BLD AUTO: 4.85 X10E6/UL (ref 3.77–5.28)
SODIUM SERPL-SCNC: 142 MMOL/L (ref 134–144)
TRIGL SERPL-MCNC: 104 MG/DL (ref 0–149)
TSH SERPL DL<=0.005 MIU/L-ACNC: 4.91 UIU/ML (ref 0.45–4.5)
VLDLC SERPL CALC-MCNC: 19 MG/DL (ref 5–40)
WBC # BLD AUTO: 11.8 X10E3/UL (ref 3.4–10.8)

## 2024-04-18 RX ORDER — ERGOCALCIFEROL 1.25 MG/1
50000 CAPSULE ORAL WEEKLY
Qty: 12 CAPSULE | Refills: 0 | Status: SHIPPED | OUTPATIENT
Start: 2024-04-18 | End: 2024-07-11

## 2024-04-23 LAB — SPECIMEN STATUS REPORT: NORMAL

## 2024-04-24 LAB — T4 FREE SERPL-MCNC: 1.15 NG/DL (ref 0.82–1.77)

## 2024-08-01 ENCOUNTER — COMMUNITY OUTREACH (OUTPATIENT)
Dept: PRIMARY CARE CLINIC | Facility: CLINIC | Age: 74
End: 2024-08-01

## 2024-08-01 NOTE — PROGRESS NOTES
Patient's HM shows they are overdue for Colonoscopy, Mammogram.   Pay by Shopping ([employer]) and  files searched without success.

## 2024-12-09 DIAGNOSIS — F41.1 GENERALIZED ANXIETY DISORDER: Chronic | ICD-10-CM

## 2024-12-09 RX ORDER — BUSPIRONE HYDROCHLORIDE 10 MG/1
10 TABLET ORAL 2 TIMES DAILY
Qty: 180 TABLET | Refills: 0 | Status: SHIPPED | OUTPATIENT
Start: 2024-12-09

## 2025-04-10 ENCOUNTER — TELEMEDICINE (OUTPATIENT)
Dept: PRIMARY CARE CLINIC | Facility: CLINIC | Age: 75
End: 2025-04-10
Payer: MEDICARE

## 2025-04-10 DIAGNOSIS — I10 ESSENTIAL (PRIMARY) HYPERTENSION: Chronic | ICD-10-CM

## 2025-04-10 DIAGNOSIS — F41.1 GENERALIZED ANXIETY DISORDER: Chronic | ICD-10-CM

## 2025-04-10 DIAGNOSIS — E55.9 VITAMIN D DEFICIENCY: Primary | ICD-10-CM

## 2025-04-10 PROCEDURE — 1159F MED LIST DOCD IN RCRD: CPT | Performed by: NURSE PRACTITIONER

## 2025-04-10 PROCEDURE — 1090F PRES/ABSN URINE INCON ASSESS: CPT | Performed by: NURSE PRACTITIONER

## 2025-04-10 PROCEDURE — G8400 PT W/DXA NO RESULTS DOC: HCPCS | Performed by: NURSE PRACTITIONER

## 2025-04-10 PROCEDURE — 1123F ACP DISCUSS/DSCN MKR DOCD: CPT | Performed by: NURSE PRACTITIONER

## 2025-04-10 PROCEDURE — G8417 CALC BMI ABV UP PARAM F/U: HCPCS | Performed by: NURSE PRACTITIONER

## 2025-04-10 PROCEDURE — 3017F COLORECTAL CA SCREEN DOC REV: CPT | Performed by: NURSE PRACTITIONER

## 2025-04-10 PROCEDURE — 1160F RVW MEDS BY RX/DR IN RCRD: CPT | Performed by: NURSE PRACTITIONER

## 2025-04-10 PROCEDURE — 1036F TOBACCO NON-USER: CPT | Performed by: NURSE PRACTITIONER

## 2025-04-10 PROCEDURE — 99214 OFFICE O/P EST MOD 30 MIN: CPT | Performed by: NURSE PRACTITIONER

## 2025-04-10 PROCEDURE — G8427 DOCREV CUR MEDS BY ELIG CLIN: HCPCS | Performed by: NURSE PRACTITIONER

## 2025-04-10 RX ORDER — AMLODIPINE BESYLATE 2.5 MG/1
2.5 TABLET ORAL DAILY
Qty: 90 TABLET | Refills: 1 | Status: SHIPPED | OUTPATIENT
Start: 2025-04-10

## 2025-04-10 RX ORDER — PAROXETINE 20 MG/1
40 TABLET, FILM COATED ORAL DAILY
Qty: 180 TABLET | Refills: 1 | Status: SHIPPED | OUTPATIENT
Start: 2025-04-10

## 2025-04-10 RX ORDER — BUSPIRONE HYDROCHLORIDE 10 MG/1
10 TABLET ORAL 2 TIMES DAILY
Qty: 180 TABLET | Refills: 1 | Status: SHIPPED | OUTPATIENT
Start: 2025-04-10

## 2025-04-10 RX ORDER — LISINOPRIL 20 MG/1
20 TABLET ORAL DAILY
Qty: 90 TABLET | Refills: 1 | Status: SHIPPED | OUTPATIENT
Start: 2025-04-10

## 2025-04-10 RX ORDER — ALPRAZOLAM 0.5 MG
0.5 TABLET ORAL DAILY PRN
Qty: 30 TABLET | Refills: 0 | Status: SHIPPED | OUTPATIENT
Start: 2025-04-10 | End: 2025-05-10

## 2025-04-10 SDOH — ECONOMIC STABILITY: FOOD INSECURITY: WITHIN THE PAST 12 MONTHS, YOU WORRIED THAT YOUR FOOD WOULD RUN OUT BEFORE YOU GOT MONEY TO BUY MORE.: NEVER TRUE

## 2025-04-10 SDOH — ECONOMIC STABILITY: FOOD INSECURITY: WITHIN THE PAST 12 MONTHS, THE FOOD YOU BOUGHT JUST DIDN'T LAST AND YOU DIDN'T HAVE MONEY TO GET MORE.: NEVER TRUE

## 2025-04-10 ASSESSMENT — PATIENT HEALTH QUESTIONNAIRE - PHQ9
9. THOUGHTS THAT YOU WOULD BE BETTER OFF DEAD, OR OF HURTING YOURSELF: NOT AT ALL
1. LITTLE INTEREST OR PLEASURE IN DOING THINGS: NOT AT ALL
2. FEELING DOWN, DEPRESSED OR HOPELESS: NOT AT ALL
3. TROUBLE FALLING OR STAYING ASLEEP: NOT AT ALL
10. IF YOU CHECKED OFF ANY PROBLEMS, HOW DIFFICULT HAVE THESE PROBLEMS MADE IT FOR YOU TO DO YOUR WORK, TAKE CARE OF THINGS AT HOME, OR GET ALONG WITH OTHER PEOPLE: NOT DIFFICULT AT ALL
SUM OF ALL RESPONSES TO PHQ QUESTIONS 1-9: 0
SUM OF ALL RESPONSES TO PHQ QUESTIONS 1-9: 0
7. TROUBLE CONCENTRATING ON THINGS, SUCH AS READING THE NEWSPAPER OR WATCHING TELEVISION: NOT AT ALL
6. FEELING BAD ABOUT YOURSELF - OR THAT YOU ARE A FAILURE OR HAVE LET YOURSELF OR YOUR FAMILY DOWN: NOT AT ALL
4. FEELING TIRED OR HAVING LITTLE ENERGY: NOT AT ALL
SUM OF ALL RESPONSES TO PHQ QUESTIONS 1-9: 0
8. MOVING OR SPEAKING SO SLOWLY THAT OTHER PEOPLE COULD HAVE NOTICED. OR THE OPPOSITE, BEING SO FIGETY OR RESTLESS THAT YOU HAVE BEEN MOVING AROUND A LOT MORE THAN USUAL: NOT AT ALL
5. POOR APPETITE OR OVEREATING: NOT AT ALL
SUM OF ALL RESPONSES TO PHQ QUESTIONS 1-9: 0

## 2025-04-10 ASSESSMENT — ENCOUNTER SYMPTOMS
SHORTNESS OF BREATH: 0
CHEST TIGHTNESS: 0

## 2025-04-10 NOTE — PROGRESS NOTES
Marysol Hotron is a 74 y.o. female who was seen via telemedicine today 4/10/2025).    Assessment & Plan:   Below is the assessment and plan developed based on review of pertinent history, physical exam, labs, studies, and medications.    1. Vitamin D deficiency  Comments:  has been using daily vitamin D supplement.  2. Generalized anxiety disorder  Comments:  doing well on paxil and buspar.  Uses limited xanax when leaving the house or for panic  Orders:  -     PARoxetine (PAXIL) 20 MG tablet; Take 2 tablets by mouth daily, Disp-180 tablet, R-1Normal  -     busPIRone (BUSPAR) 10 MG tablet; Take 1 tablet by mouth 2 times daily, Disp-180 tablet, R-1Normal  -     ALPRAZolam (XANAX) 0.5 MG tablet; Take 1 tablet by mouth daily as needed for Sleep for up to 30 days. Max Daily Amount: 0.5 mg, Disp-30 tablet, R-0Normal  3. Essential (primary) hypertension  Comments:  stable on current medications  Orders:  -     amLODIPine (NORVASC) 2.5 MG tablet; Take 1 tablet by mouth daily, Disp-90 tablet, R-1Normal  -     lisinopril (PRINIVIL;ZESTRIL) 20 MG tablet; Take 1 tablet by mouth daily, Disp-90 tablet, R-1Normal    Will plan for [facility] this summer and to check labs at that time as patient lives 45 minutes from office and needs family member to drive her.       Return in about 4 months (around 8/10/2025) for [facility].    Subjective:   Marysol was seen today for Medication Refill     Hypertension: Patients hypertension is well controlled on regimen of lisinopril and amlodipine. Denies headaches, blurred vision or dizziness.      Vitamin D Deficiency: Patient has been using Vitamin D 2000 mcg.  Does not feel any increase in her energy since doing this.     Depression/Anxiety:  Patient notes that their anxiety is well controlled on paxil, buspar and PRN xanax.     Review of Systems   Constitutional:  Negative for fatigue.   Eyes:  Negative for visual disturbance.   Respiratory:  Negative for chest tightness and shortness of breath.

## 2025-04-10 NOTE — PROGRESS NOTES
Chief Complaint   Patient presents with    Medication Refill     \"Have you been to the ER, urgent care clinic since your last visit?  Hospitalized since your last visit?\"    NO    “Have you seen or consulted any other health care providers outside our system since your last visit?”    NO    Have you had a mammogram?”   NO    No breast cancer screening on file       “Have you had a colorectal cancer screening such as a colonoscopy/FIT/Cologuard?    NO    No colonoscopy on file  No cologuard on file  No FIT/FOBT on file   No flexible sigmoidoscopy on file

## 2025-08-22 ENCOUNTER — COMMUNITY OUTREACH (OUTPATIENT)
Dept: PRIMARY CARE CLINIC | Facility: CLINIC | Age: 75
End: 2025-08-22